# Patient Record
Sex: FEMALE | Race: WHITE | NOT HISPANIC OR LATINO | Employment: STUDENT | URBAN - METROPOLITAN AREA
[De-identification: names, ages, dates, MRNs, and addresses within clinical notes are randomized per-mention and may not be internally consistent; named-entity substitution may affect disease eponyms.]

---
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
## 2021-09-27 ENCOUNTER — COMMUNICATION - RIVER FALLS (OUTPATIENT)
Dept: FAMILY MEDICINE | Facility: CLINIC | Age: 21
End: 2021-09-27

## 2021-10-14 ENCOUNTER — COMMUNICATION - RIVER FALLS (OUTPATIENT)
Dept: FAMILY MEDICINE | Facility: CLINIC | Age: 21
End: 2021-10-14

## 2021-10-25 ENCOUNTER — OFFICE VISIT - RIVER FALLS (OUTPATIENT)
Dept: FAMILY MEDICINE | Facility: CLINIC | Age: 21
End: 2021-10-25

## 2022-01-26 ENCOUNTER — OFFICE VISIT - RIVER FALLS (OUTPATIENT)
Dept: FAMILY MEDICINE | Facility: CLINIC | Age: 22
End: 2022-01-26
Payer: COMMERCIAL

## 2022-02-11 VITALS
HEART RATE: 84 BPM | DIASTOLIC BLOOD PRESSURE: 62 MMHG | BODY MASS INDEX: 23.99 KG/M2 | SYSTOLIC BLOOD PRESSURE: 116 MMHG | HEIGHT: 65 IN | WEIGHT: 144 LBS | TEMPERATURE: 98.4 F

## 2022-02-11 VITALS
SYSTOLIC BLOOD PRESSURE: 104 MMHG | HEIGHT: 65 IN | DIASTOLIC BLOOD PRESSURE: 58 MMHG | TEMPERATURE: 98.4 F | WEIGHT: 132.8 LBS | BODY MASS INDEX: 22.13 KG/M2 | HEART RATE: 82 BPM

## 2022-02-11 VITALS
WEIGHT: 146 LBS | HEART RATE: 92 BPM | SYSTOLIC BLOOD PRESSURE: 118 MMHG | TEMPERATURE: 98.9 F | BODY MASS INDEX: 24.32 KG/M2 | HEIGHT: 65 IN | DIASTOLIC BLOOD PRESSURE: 72 MMHG

## 2022-02-11 VITALS
DIASTOLIC BLOOD PRESSURE: 60 MMHG | TEMPERATURE: 98.2 F | SYSTOLIC BLOOD PRESSURE: 118 MMHG | BODY MASS INDEX: 24.89 KG/M2 | HEIGHT: 65 IN | WEIGHT: 149.4 LBS | HEART RATE: 92 BPM

## 2022-02-15 NOTE — NURSING NOTE
Comprehensive Intake Entered On:  6/3/2020 1:26 PM CDT    Performed On:  6/3/2020 1:19 PM CDT by Hansa Harrell LPN               Summary   Chief Complaint :   cough x3-4 weeks, runny nose, HA, no fever - verbal consent for video visit via laptop/computer     Menstrual Status :   Menarcheal   Hansa Harrell LPN - 6/3/2020 1:19 PM CDT   Health Status   Allergies Verified? :   Yes   Medication History Verified? :   Yes   Medical History Verified? :   No   Pre-Visit Planning Status :   Not completed   Tobacco Use? :   Never smoker   Hansa Harrell LPN - 6/3/2020 1:19 PM CDT   Meds / Allergies   (As Of: 6/3/2020 1:26:09 PM CDT)   Allergies (Active)   No Known Medication Allergies  Estimated Onset Date:   Unspecified ; Created By:   Hansa Harrell LPN; Reaction Status:   Active ; Category:   Drug ; Substance:   No Known Medication Allergies ; Type:   Allergy ; Updated By:   Hansa Harrell LPN; Reviewed Date:   6/3/2020 1:25 PM CDT        Medication List   (As Of: 6/3/2020 1:26:09 PM CDT)   Prescription/Discharge Order    medroxyPROGESTERone  :   medroxyPROGESTERone ; Status:   Prescribed ; Ordered As Mnemonic:   Depo-Provera Contraceptive 150 mg/mL intramuscular suspension ; Simple Display Line:   150 mg, im, once, given now in L deltoid. lot:[id number] exp:12/2019. due for next shot 11/14/17-11/28/17, 1 mL, 0 Refill(s) ; Ordering Provider:   Rebekah Reza; Catalog Code:   medroxyPROGESTERone ; Order Dt/Tm:   8/29/2017 12:39:34 PM CDT            ID Risk Screen   Recent Travel History :   No recent travel   Family Member Travel History :   No recent travel   Other Exposure to Infectious Disease :   Unknown   Hansa Harrell LPN - 6/3/2020 1:19 PM CDT

## 2022-02-15 NOTE — PROGRESS NOTES
Patient:   SHEFALI WILSON            MRN: [id number]            FIN: [id number]               Age:   19 years     Sex:  Female     :  2000   Associated Diagnoses:   Cough; Suspected COVID-19 virus infection   Author:   Rebekah Reza      Visit Information      Date of Service: 2020 12:26 pm  Performing Location: [facility]  Encounter#: [id number]      Primary Care Provider (PCP):  Rebekah Reza    NPI# [id number]      Referring Provider:  Rebekah Reza# [id number]   Visit type:  video.    Participants in room during visit:  _Alvina Mao   Location of patient:  _home  Location of provider:  _clinic   Video Start Time:  _1:45  Video End Time:   _2:00    Today's visit was conducted via video conference due to the COVID-19 pandemic.  The patient's consent to proceed with a video visit has been obtained and documented.      Chief Complaint   6/3/2020 1:19 PM CDT     cough x3-4 weeks, runny nose, HA, no fever - verbal consent for video visit via laptop/computer        History of Present Illness   Patient is a _19 year old _female who is being evaluated via a billable video visit.  Has had cough 3-4 weeks, some nasal stuffiness and sore throat. 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  She was suppose to go to work today. She is not aware of others at the factory coughing.  Her family members are not ill.  no hx of asthma, no hx of use of an inhaler      Health Status   Allergies:    Allergic Reactions (Selected)  No Known Medication Allergies   Medications:  (Selected)   Prescriptions  Prescribed  Azithromycin 5 Day Dose Pack 250 mg oral tablet: 500mg day 1, 250 mg day 2-5, Oral, daily, # 6 tab(s), 0 Refill(s), Type: Maintenance, Pharmacy: [facility], 500mg day 1, 250 mg day 2-5 Oral daily, 65.25, in, 19 9:23:00 CDT, Height  Measured, 149.4, lb, 07/23/19 9:23:00 CDT, Weight Measured...  Depo-Provera Contraceptive 150 mg/mL intramuscular suspension: ( 150 mg ), im, once, Instructions: given now in L deltoid. lot:[id number] exp:12/2019. due for next shot 11/14/17-11/28/17, # 1 mL, 0 Refill(s), Type: Soft Stop, other reason (Rx)  predniSONE 5 mg oral tablet: = 3 tab(s) ( 15 mg ), Oral, bid, Instructions: take in a.m. and at 2pm, # 30 tab(s), 0 Refill(s), Type: Maintenance, Pharmacy: [facility], 3 tab(s) Oral bid,x5 day(s),Instr:take in a.m. and at 2pm, 65.25, in, 07/23/19 9:23:00 CDT, Height Measure...,    Medications          *denotes recorded medication          Azithromycin 5 Day Dose Pack 250 mg oral tablet: 500mg day 1, 250 mg day 2-5, Oral, daily, 6 tab(s), 0 Refill(s).          Depo-Provera Contraceptive 150 mg/mL intramuscular suspension: 150 mg, im, once, given now in L deltoid. lot:[id number] exp:12/2019. due for next shot 11/14/17-11/28/17, 1 mL, 0 Refill(s).          predniSONE 5 mg oral tablet: 15 mg, 3 tab(s), Oral, bid, for 5 day(s), take in a.m. and at 2pm, 30 tab(s), 0 Refill(s).       Problem list:    All Problems  Primary dysmenorrhea / SNOMED CT 194221332 / Confirmed      Histories   Past Medical History:    Resolved  Acute bronchiolitis due to respiratory syncytial virus (RSV) ([id number]):  Resolved.   Family History:    No family history items have been selected or recorded.   Procedure history:    No active procedure history items have been selected or recorded.   Social History:        Alcohol Assessment            Never, Household alcohol concerns: No.  Use of alcohol by peers: No.      Tobacco Assessment            Never, Household tobacco concerns: No.  Use of tobacco by peers: No.      Substance Abuse Assessment            Never, Household substance abuse concerns: No.  Use of drugs by peers: No.      Employment and Education Assessment            Student      Home and Environment Assessment            Lives with  Mother, Siblings.  Injuries/Abuse/Neglect in household: No.  Feels unsafe at home: No.               Family/Friends available for support: Yes.      Nutrition and Health Assessment            Type of diet: Regular.  Wants to lose weight: No.  Concerns about weight/body image: No.  Obtaining food is a               problem: No.      Exercise and Physical Activity Assessment            Exercise frequency: Daily.  Exercise type: Running, Walking.      Sexual Assessment            Sexually active: No.  Identifies as female, Sexual orientation: Straight or heterosexual.  History of STD:               No.  Contraceptive Use Details: Birth control shot.  History of sexual abuse: No.        Physical Examination   General:  Alert and oriented, No acute distress, harsh deep cough appreciated.    Eye:  Normal conjunctiva.    Respiratory:  Respirations are non-labored.    Psychiatric:  Cooperative, Appropriate mood & affect, Normal judgment.       Impression and Plan   Diagnosis     Cough (ZDO20-AZ R05).     Suspected COVID-19 virus infection (QAP99-HM R68.89).     Plan:  It is probable that she contracted a viral infection that has left her with reported tenderness over the sinuses and post nasal drip and cough. WIll treat this as sinusitis with antibiotic and prednisone; however,  given COVID19 in area, and fact that works at a factory, advised COVID19 testing for active disease and self quarantine till results are back. I wrote her a note for work  Patient is referred for [facility] COVID-19 testing and is instructed of the following:  Patient should remain isolated until results of test return and given that tests are not 100% accurate, would be safest to assume that they are contagious with COVID-19 until their symptoms have fully resolved. Isolation is recommended for at least 7 days from the onset of symptoms   Patient also is informed that testing will be done in their car at a scheduled time. Test will be sent to an  outside commercial lab and billed by that lab. [facility] cannot confirm to patient how billing will be handled by their insurance company.    Patient is also informed that testing for COVID-19 must be reported to the public health department along with contact information for the patient.   Patient information is given to scheduling staff to get patient scheduled for testing. Patient will receive further instructions from scheduling staff.  Patient is encouraged to call back at any time with questions or concerns.    I wrote a note for her employer, if she tests negative, she can return to work next week as she has been coughing for 3-4 weeks and is not likely infectious.  If she tests positive, she will need full 10 day isolation.    Orders     Orders (Selected)   Prescriptions  Prescribed  Azithromycin 5 Day Dose Pack 250 mg oral tablet: 500mg day 1, 250 mg day 2-5, Oral, daily, # 6 tab(s), 0 Refill(s), Type: Maintenance, Pharmacy: [facility], 500mg day 1, 250 mg day 2-5 Oral daily, 65.25, in, 07/23/19 9:23:00 CDT, Height Measured, 149.4, lb, 07/23/19 9:23:00 CDT, Weight Measured...  predniSONE 5 mg oral tablet: = 3 tab(s) ( 15 mg ), Oral, bid, Instructions: take in a.m. and at 2pm, # 30 tab(s), 0 Refill(s), Type: Maintenance, Pharmacy: [facility], 3 tab(s) Oral bid,x5 day(s),Instr:take in a.m. and at 2pm, 65.25, in, 07/23/19 9:23:00 CDT, Height Measure....

## 2022-02-15 NOTE — NURSING NOTE
Phone Message    PCP:    MARISSA      Time of Call: 11:27       Person Calling:  mother Alvina  Phone number:  [phone]    Time returned call:  11:37    Note:  Patient needs second TB screen.  Per protocol she can receive second 10 days after first.  Mother transferred to schedule nurse visit.

## 2022-02-15 NOTE — LETTER
(Inserted Image. Unable to display)     May 24, 2019      BRANDONARACELI CASANOVAON  [address] [place], [place] [id number]          Dear [place],      Thank you for selecting [facility] (previously [facility]) for your healthcare needs.      Our records indicate you are due for the following services:     Depo Provera injection due between 05/31 and 06/14/2019.      To schedule an appointment or if you have further questions, please contact your primary clinic:   [facility]       [phone]   [facility]       [phone]              [facility]     [phone]      Powered by [employer]press and [employer]    Sincerely,    MEEK JamisonNP

## 2022-02-15 NOTE — TELEPHONE ENCOUNTER
---------------------  From: Carmel KIM, Maritza DAVISON   To: Wiki-PR Message Pool ([id number]_[facility]);     Sent: 7/16/2020 11:40:59 AM CDT  Subject: Birth control     Phone Message    PCP:   Julia ANN    Time of Call:  _ 1127       Person Calling:  _ Mother of pt Alvina  Phone number:  _ [phone]      Note:   _ Mother of pt LM stating that pt had stopped getting the depo shots to see how her periods would be. Patient is getting her period every other week. Mother wondering if pt could try a birth control pill to help with this.     Please advise    Last office visit and reason:  _ 6-3-2020 videoAppt?---------------------  From: Hansa Harrell LPN (Wiki-PR Message Pool ([id number]_[facility]))   To: Rebekah Reza;     Sent: 7/16/2020 12:38:09 PM CDT  Subject: FW: Birth control---------------------  From: Rebekah Reza   To: Wiki-PR Message Pool ([id number]_[facility]);     Sent: 7/16/2020 12:44:36 PM CDT  Subject: RE: Birth control     it looks like she has been on pills in the past so I sent rx for the most recent pill she used and she can start it anytime (as long as she knows she isn't pregnant and then do a follow up video visit with me in 3-4 [id number] LM on mom's VM to have her have the patient call back.Pt called clinic and LM that she wants to follow up. I called pt back, states that she hasn't gotten period yet and would like to follow up with [facility]. I transferred her to scheduling to schedule video visit.

## 2022-02-15 NOTE — NURSING NOTE
PPD Reading POC Entered On:  6/20/2019 6:20 PM CDT    Performed On:  6/20/2019 6:16 PM CDT by Nikki Browne CMA               PPD Reading   PPD Interpretation :   Negative   PPD Completion Status :   Completed   Nikki Browne CMA - 6/20/2019 6:19 PM CDT

## 2022-02-15 NOTE — LETTER
(Inserted Image. Unable to display)     March 08, 2019      BRANDONARACELI CASANOVAON  [address] [place], [place] [id number]          Dear [place],      Thank you for selecting [facility] ([facility]viously [facility]) for your healthcare needs.      Our records indicate you are due for the following services:     Depo Provera injection due between 03/15 and 03/29/2019.      To schedule an appointment or if you have further questions, please contact your primary clinic:   [facility]       [phone]   [facility]       [phone]              [facility]     [phone]      Powered by [employer] and [employer]    Sincerely,    MEEK JamisonNP

## 2022-02-15 NOTE — LETTER
(Inserted Image. Unable to display)   January 21, 2020        SHEFALI CASANOVAON  [address] [place], [place] [id number]        Dear [place],      Thank you for selecting [facility] for your healthcare needs.    Our records indicate you are due for the following services:     Depo Provera injection due between 1/28/20 and 2/11/20.    To schedule an appointment or if you have further questions, please contact your primary clinic:   [facility]       [phone]   [facility]       [phone]              [facility]     [phone]      Powered by [employer]    Sincerely,    MEEK JamisonNP

## 2022-02-15 NOTE — PROGRESS NOTES
Patient:   SHEFALI WILSON            MRN: [id number]            FIN: [id number]               Age:   16 years     Sex:  Female     :  2000   Associated Diagnoses:   Well child check; Primary dysmenorrhea   Author:   Rebekah Reza      Visit Information      Date of Service: 2017 10:59 am  Performing Location: [facility]  Encounter#: [id number]      Primary Care Provider (PCP):  Rebekah Reza    NPI# [id number]   Visit type:  Well child exam.    Source of history:  Self.       Chief Complaint   2017 11:13 AM CDT   well child        Well Child History   Well Child History   Academics/ activities above average performance and   Denies Bullying    Wears Seat belts, will try for drivers license    Safe at home and at school    Denies anxiety /depression    Denies Smoker/drugs/ETOH/steroid use    Denies Sexually active, on depo for 6 months, has helped with heavy/irreg periods, only light spotting now  .     Socialization interacting well with family/ relatives and interacting well with peers/ friends.     Diet/ Feeding Diet includes dairy (skim or 1%), fruits and vegetables, protein, minimal soda and caffeine, minimal fast food and generally Eats breakfast.     Sleeping good sleeper.        Review of Systems   Constitutional:  Negative except as documented in history of present illness.    Eye:  Negative except as documented in history of present illness.    Ear/Nose/Mouth/Throat:  Negative except as documented in history of present illness.    Respiratory:  Negative except as documented in history of present illness.    Cardiovascular:  Negative except as documented in history of present illness.    Gastrointestinal:  Negative except as documented in history of present illness.    Genitourinary:  Negative except as documented in history of present illness.    Hematology/Lymphatics:  Negative except as documented in history of present illness.    Endocrine:  Negative except as  documented in history of present illness.    Immunologic:  Negative except as documented in history of present illness.    Musculoskeletal:  Negative except as documented in history of present illness.    Integumentary:  Negative except as documented in history of present illness.    Neurologic:  Negative except as documented in history of present illness.    Psychiatric:  Negative except as documented in history of present illness.    All other systems reviewed and negative      Health Status   Allergies:    Allergic Reactions (Selected)  Severity Not Documented  No known allergies (No reactions were documented)   Medications:  (Selected)   Prescriptions  Prescribed  Depo-Provera Contraceptive 150 mg/mL intramuscular suspension: ( 150 mg ), im, once, Instructions: given now in L deltoid. lot:[id number] exp:12/2019. due for next shot 11/14/17-11/28/17, # 1 mL, 0 Refill(s), Type: Soft Stop, other reason (Rx)   Problem list:    All Problems  Primary dysmenorrhea / SNOMED CT 478726130 / Confirmed  Resolved: Acute bronchiolitis due to respiratory syncytial virus (RSV) / SNOMED CT 926079547  Canceled: Influenza with Other Respiratory Manifestations / ICD-9-.1  Nasal swab.      Histories   Past Medical History:    Resolved  Acute bronchiolitis due to respiratory syncytial virus (RSV) ([id number]):  Resolved.   Family History:    No family history items have been selected or recorded.   Procedure history:    No active procedure history items have been selected or recorded.   Social History:        Alcohol Assessment            Never, Household alcohol concerns: No.  Use of alcohol by peers: No.      Tobacco Assessment            Never, Household tobacco concerns: No.  Use of tobacco by peers: No.      Substance Abuse Assessment            Never, Household substance abuse concerns: No.  Use of drugs by peers: No.      Home and Environment Assessment            Lives with Mother, Siblings.        Physical Examination    Vital Signs   8/29/2017 11:13 AM CDT Temperature Tympanic 98.9 DegF    Peripheral Pulse Rate 92 bpm  HI    Pulse Site Radial artery    HR Method Manual    Systolic Blood Pressure 118 mmHg    Diastolic Blood Pressure 72 mmHg    Mean Arterial Pressure 87 mmHg    BP Site Right arm    BP Method Manual      Measurements from flowsheet : Measurements   8/29/2017 11:13 AM CDT Height Measured - Standard 65.24 in    Weight Measured - Standard 146.0 lb    BSA 1.74 m2    Body Mass Index 24.11 kg/m2    Body Mass Index Percentile 82.66      General:  Alert and oriented.    Developmental screen - 13-17 year:  Elicited on exam.    Eye:  Pupils are equal, round and reactive to light, Normal conjunctiva.    HENT:  Normocephalic, Tympanic membranes are clear, Normal hearing, Oral mucosa is moist, No pharyngeal erythema.    Neck:  Supple, Non-tender, No lymphadenopathy, No thyromegaly.    Respiratory:  Lungs are clear to auscultation, Respirations are non-labored, Breath sounds are equal, Symmetrical chest wall expansion.    Cardiovascular:  Normal rate, Regular rhythm, No murmur, No gallop, Normal peripheral perfusion, No edema.    Gastrointestinal:  Soft, Non-tender, Non-distended, No organomegaly.    Musculoskeletal:  Normal range of motion, Normal strength, No tenderness, No swelling, No deformity, Normal gait.    Integumentary:  Warm, Dry, Pink, Intact, No rash.    Neurologic:  Alert, Oriented, Normal sensory, Normal motor function, Cranial Nerves II-XII are grossly intact.    Psychiatric:  Cooperative, Appropriate mood & affect, Normal judgment.       Impression and Plan   Diagnosis     Well child check (ENX67-QD Z00.129).     Primary dysmenorrhea (ULA81-QN N94.4).     Patient Instructions:       Counseled: Patient, Caregiver, Regarding diagnosis, Regarding treatment, Regarding medications, Diet, Activity, Verbalized understanding.    Anticipatory Guidance:       Adolescence (11 - 21 years): School performance, Television, Sex  education/ STD's, Seatbelts/ airbags, Depression/ anxiety, Alcohol/ drugs/ smoking, Suicide, Nutrition/ oral health ( Balanced meals, Brushing/ flossing, Avoiding tobacco ).    Counseled:  Patient.

## 2022-02-15 NOTE — PROGRESS NOTES
Patient:   SHEFALI WILSON            MRN: [id number]            FIN: [id number]               Age:   18 years     Sex:  Female     :  2000   Associated Diagnoses:   None   Author:   Rebekah Reza      Visit Information   Visit type:  Well child exam.    Source of history:  Self.       Chief Complaint   2019 9:23 AM CDT    Pt here for well child        Well Child History   Well Child History   Academics/ activities above average performance and   Denies Bullying    Wears Seat belts    Safe at home and at school    Denies anxiety /depression    Denies Smoker/drugs/ETOH/steroid use    Denies Sexually active, on depo for about 3 years for heavy/crampy periods, desires to continue as no menses  .     Socialization interacting well with family/ relatives and interacting well with peers/ friends.     Diet/ Feeding Diet includes dairy (skim or 1%), fruits and vegetables, protein, minimal soda and caffeine, minimal fast food and generally Eats breakfast.     Sleeping good sleeper.        Review of Systems   Constitutional:  Negative except as documented in history of present illness.    Eye:  Negative except as documented in history of present illness.    Ear/Nose/Mouth/Throat:  Negative except as documented in history of present illness.    Respiratory:  Negative except as documented in history of present illness.    Cardiovascular:  Negative except as documented in history of present illness.    Gastrointestinal:  Negative except as documented in history of present illness.    Genitourinary:  Negative except as documented in history of present illness.    Hematology/Lymphatics:  Negative except as documented in history of present illness.    Endocrine:  Negative except as documented in history of present illness.    Immunologic:  Negative except as documented in history of present illness.    Musculoskeletal:  Negative except as documented in history of present illness.    Integumentary:  Negative except  as documented in history of present illness.    Neurologic:  Negative except as documented in history of present illness.    Psychiatric:  Negative except as documented in history of present illness.    All other systems reviewed and negative      Health Status   Allergies:    Allergic Reactions (Selected)  Severity Not Documented  No known allergies (No reactions were documented)   Medications:  (Selected)   Prescriptions  Prescribed  Depo-Provera Contraceptive 150 mg/mL intramuscular suspension: ( 150 mg ), im, once, Instructions: given now in L deltoid. lot:[id number] exp:12/2019. due for next shot 11/14/17-11/28/17, # 1 mL, 0 Refill(s), Type: Soft Stop, other reason (Rx)   Problem list:    All Problems  Primary dysmenorrhea / SNOMED CT 295169048 / Confirmed  Resolved: Acute bronchiolitis due to respiratory syncytial virus (RSV) / SNOMED CT 003951533  Canceled: Influenza with Other Respiratory Manifestations / ICD-9-.1  Nasal swab.      Histories   Past Medical History:    Resolved  Acute bronchiolitis due to respiratory syncytial virus (RSV) (270875969):  Resolved.   Family History:    No family history items have been selected or recorded.   Procedure history:    No active procedure history items have been selected or recorded.   Social History:        Alcohol Assessment            Never, Household alcohol concerns: No.  Use of alcohol by peers: No.      Tobacco Assessment            Never, Household tobacco concerns: No.  Use of tobacco by peers: No.      Substance Abuse Assessment            Never, Household substance abuse concerns: No.  Use of drugs by peers: No.      Home and Environment Assessment            Lives with Mother, Siblings.      Physical Examination   Vital Signs   7/23/2019 9:23 AM CDT Temperature Tympanic 98.2 DegF    Peripheral Pulse Rate 92 bpm  HI    Systolic Blood Pressure 118 mmHg    Diastolic Blood Pressure 60 mmHg    Mean Arterial Pressure 79 mmHg      Measurements from flowsheet :  Measurements   7/23/2019 9:23 AM CDT Height Measured - Standard 65.25 in    Weight Measured - Standard 149.4 lb    BSA 1.76 m2    Body Mass Index 24.67 kg/m2    Body Mass Index Percentile 80.66      General:  Alert and oriented.    Developmental screen - 13-17 year:  Elicited on exam.    Eye:  Pupils are equal, round and reactive to light, Normal conjunctiva.    HENT:  Normocephalic, Tympanic membranes are clear, Normal hearing, Oral mucosa is moist, No pharyngeal erythema.    Neck:  Supple, Non-tender, No lymphadenopathy, No thyromegaly.    Respiratory:  Lungs are clear to auscultation, Respirations are non-labored, Breath sounds are equal, Symmetrical chest wall expansion.    Cardiovascular:  Normal rate, Regular rhythm, No murmur, No gallop, Normal peripheral perfusion, No edema.    Gastrointestinal:  Soft, Non-tender, Non-distended, No organomegaly.    Musculoskeletal:  Normal range of motion, Normal strength, No tenderness, No swelling, No deformity, Normal gait.    Integumentary:  Warm, Dry, Pink, Intact, No rash.    Neurologic:  Alert, Oriented, Normal sensory, Normal motor function, Cranial Nerves II-XII are grossly intact.    Psychiatric:  Cooperative, Appropriate mood & affect, Normal judgment.       Impression and Plan   Diagnosis   Patient Instructions:       Counseled: Patient, Caregiver, Regarding diagnosis, Regarding treatment, Regarding medications, Diet, Activity, Verbalized understanding.    Anticipatory Guidance:       Adolescence (11 - 21 years): School performance, Television, Seatbelts/ airbags, Depression/ anxiety, Alcohol/ drugs/ smoking, Nutrition/ oral health ( Balanced meals, Brushing/ flossing, Avoiding tobacco ).    Counseled:  Patient.

## 2022-02-15 NOTE — NURSING NOTE
Comprehensive Intake Entered On:  10/25/2021 2:56 PM CDT    Performed On:  10/25/2021 2:53 PM CDT by Hansa Harrell LPN               Summary   Chief Complaint :   needs refills on birth control pills, uses [facility] in [place] - verbal consent for video visit    Menstrual Status :   Menarcheal   Hansa Harrell LPN - 10/25/2021 2:53 PM CDT   Health Status   Allergies Verified? :   Yes   Medication History Verified? :   Yes   Medical History Verified? :   No   Pre-Visit Planning Status :   Not completed   Tobacco Use? :   Never smoker   Hansa Harrell LPN - 10/25/2021 2:53 PM CDT   Meds / Allergies   (As Of: 10/25/2021 2:56:23 PM CDT)   Allergies (Active)   No Known Medication Allergies  Estimated Onset Date:   Unspecified ; Created By:   Hansa Harrell LPN; Reaction Status:   Active ; Category:   Drug ; Substance:   No Known Medication Allergies ; Type:   Allergy ; Updated By:   Hansa Harrell LPN; Reviewed Date:   8/12/2020 4:16 PM CDT        Medication List   (As Of: 10/25/2021 2:56:23 PM CDT)   Prescription/Discharge Order    Miscellaneous Prescription  :   Miscellaneous Prescription ; Status:   Prescribed ; Ordered As Mnemonic:   valved holding chamber spacer ; Simple Display Line:   See Instructions, use with steroid inhaler, 1 EA, 0 Refill(s) ; Ordering Provider:   Rebekah Reza; Catalog Code:   Miscellaneous Prescription ; Order Dt/Tm:   6/8/2020 10:49:38 AM CDT          desogestrel-ethinyl estradiol  :   desogestrel-ethinyl estradiol ; Status:   Prescribed ; Ordered As Mnemonic:   Isibloom 0.15 mg-0.03 mg oral tablet ; Simple Display Line:   1 tab(s), Oral, daily, 28 tab(s), 0 Refill(s) ; Ordering Provider:   Rebekah Reza; Catalog Code:   desogestrel-ethinyl estradiol ; Order Dt/Tm:   10/15/2021 3:22:06 PM CDT          fluticasone  :   fluticasone ; Status:   Prescribed ; Ordered As Mnemonic:   fluticasone CFC free 220 mcg/inh inhalation aerosol ; Simple Display Line:   2  puff(s), inh, bid, for 30 day(s), use if cough persists  use with spacer chamber  rinse mouth and throat after use, 1 EA, 1 Refill(s) ; Ordering Provider:   Rebekah Reza; Catalog Code:   fluticasone ; Order Dt/Tm:   6/8/2020 10:45:37 AM CDT            Social History   Social History   (As Of: 10/25/2021 2:56:23 PM CDT)   Alcohol:        Never, Household alcohol concerns: No.  Use of alcohol by peers: No.   (Last Updated: 8/6/2015 6:11:53 PM CDT by Kathy Gu CMA)          Tobacco:        Never (less than 100 in lifetime)   (Last Updated: 10/25/2021 2:54:55 PM CDT by Hansa Harrell LPN)          Electronic Cigarette/Vaping:        Electronic Cigarette Use: Never.   (Last Updated: 10/25/2021 2:55:00 PM CDT by Hansa Harrell LPN)          Substance Abuse:        Never, Household substance abuse concerns: No.  Use of drugs by peers: No.   (Last Updated: 8/6/2015 6:12:14 PM CDT by Kathy Gu CMA)          Employment/School:        Student   (Last Updated: 7/24/2019 10:10:05 AM CDT by Keely Strange)          Home/Environment:        Lives with Mother, Siblings.  Injuries/Abuse/Neglect in household: No.  Feels unsafe at home: No.  Family/Friends available for support: Yes.   (Last Updated: 7/24/2019 10:10:38 AM CDT by Keely Strange)          Nutrition/Health:        Type of diet: Regular.  Wants to lose weight: No.  Concerns about weight/body image: No.  Obtaining food is a problem: No.   (Last Updated: 7/24/2019 10:10:49 AM CDT by Keely Strange)          Exercise:        Exercise frequency: Daily.  Exercise type: Running, Walking.   (Last Updated: 7/24/2019 10:11:04 AM CDT by Keely Strange)          Sexual:        Sexually active: No.  Identifies as female, Sexual orientation: Straight or heterosexual.  History of STD: No.  Contraceptive Use Details: Birth control shot.  History of sexual abuse: No.   (Last Updated: 7/24/2019 10:11:33 AM CDT by Keely Strange)

## 2022-02-15 NOTE — NURSING NOTE
Comprehensive Intake Entered On:  7/23/2019 9:25 AM CDT    Performed On:  7/23/2019 9:23 AM CDT by Fior Antunez               Summary   Chief Complaint :   Pt here for well child   Menstrual Status :   Menarcheal   Weight Measured :   149.4 lb(Converted to: 149 lb 6 oz, 67.77 kg)    Height Measured :   65.25 in(Converted to: 5 ft 5 in, 165.73 cm)    Body Mass Index :   24.67 kg/m2   Body Surface Area :   1.76 m2   Systolic Blood Pressure :   118 mmHg   Diastolic Blood Pressure :   60 mmHg   Mean Arterial Pressure :   79 mmHg   Peripheral Pulse Rate :   92 bpm (HI)    Temperature Tympanic :   98.2 DegF(Converted to: 36.8 DegC)    Fior Antunez - 7/23/2019 9:23 AM CDT   Health Status   Allergies Verified? :   Yes   Medication History Verified? :   Yes   Medical History Verified? :   Yes   Pre-Visit Planning Status :   Completed   Well Child Visit? :   Yes   Fior Antunez - 7/23/2019 9:23 AM CDT   Consents   Consent for Immunization Exchange :   Consent Granted   Consent for Immunizations to Providers :   Consent Granted   Fior Antunez - 7/23/2019 9:23 AM CDT   Meds / Allergies   (As Of: 7/23/2019 9:25:02 AM CDT)   Allergies (Active)   No known allergies  Estimated Onset Date:   Unspecified ; Created By:   Generated Domain User for [id number]; Reaction Status:   Active ; Substance:   No known allergies ; Updated By:   Generated Domain User for [id number]; Reviewed Date:   7/23/2019 9:24 AM CDT        Medication List   (As Of: 7/23/2019 9:25:03 AM CDT)   Prescription/Discharge Order    medroxyPROGESTERone  :   medroxyPROGESTERone ; Status:   Prescribed ; Ordered As Mnemonic:   Depo-Provera Contraceptive 150 mg/mL intramuscular suspension ; Simple Display Line:   150 mg, im, once, given now in L deltoid. lot:[id number] exp:12/2019. due for next shot 11/14/17-11/28/17, 1 mL, 0 Refill(s) ; Ordering Provider:   Rebekah Reza; Catalog Code:   medroxyPROGESTERone ; Order Dt/Tm:   8/29/2017 12:39:34 PM             Vision Testing POC   Corrective Lenses :   None   Eye, Left Visual Acuity :   20/20   Eye, Right Visual Acuity :   20/20   Eye, Bilateral Visual Acuity :   20/20   Fior Antunez - 7/23/2019 9:27 AM CDT

## 2022-02-15 NOTE — NURSING NOTE
Comprehensive Intake Entered On:  8/12/2020 4:18 PM CDT    Performed On:  8/12/2020 4:14 PM CDT by Tate Meade CMAie               Summary   Chief Complaint :   Follow up on birth control. Doing well. Verbal consent granted for video visit.   Last Menstrual Period :   7/10/2020 CDT   Menstrual Status :   Menarcheal   Mikayla Meade CMA - 8/12/2020 4:14 PM CDT   Health Status   Allergies Verified? :   Yes   Medication History Verified? :   Yes   Medical History Verified? :   Yes   Pre-Visit Planning Status :   Completed   Tobacco Use? :   Never smoker   Mikayla Meade CMA - 8/12/2020 4:14 PM CDT   Consents   Consent for Immunization Exchange :   Consent Granted   Consent for Immunizations to Providers :   Consent Granted   Deshaun ELMERLitoMikayla - 8/12/2020 4:14 PM CDT   Meds / Allergies   (As Of: 8/12/2020 4:18:22 PM CDT)   Allergies (Active)   No Known Medication Allergies  Estimated Onset Date:   Unspecified ; Created By:   Hansa Harrell LPN; Reaction Status:   Active ; Category:   Drug ; Substance:   No Known Medication Allergies ; Type:   Allergy ; Updated By:   Hansa Harrell LPN; Reviewed Date:   8/12/2020 4:16 PM CDT        Medication List   (As Of: 8/12/2020 4:18:22 PM CDT)   Prescription/Discharge Order    azithromycin  :   azithromycin ; Status:   Completed ; Ordered As Mnemonic:   Azithromycin 5 Day Dose Pack 250 mg oral tablet ; Simple Display Line:   500mg day 1, 250 mg day 2-5, Oral, daily, 6 tab(s), 0 Refill(s) ; Ordering Provider:   Rebekah Reza; Catalog Code:   azithromycin ; Order Dt/Tm:   6/3/2020 2:00:48 PM CDT          desogestrel-ethinyl estradiol  :   desogestrel-ethinyl estradiol ; Status:   Prescribed ; Ordered As Mnemonic:   Reclipsen 0.15 mg-0.03 mg oral tablet ; Simple Display Line:   1 tab(s), po, daily, 84 tab(s), 0 Refill(s) ; Ordering Provider:   Rebekah Reza; Catalog Code:   desogestrel-ethinyl estradiol ; Order Dt/Tm:   7/16/2020 12:43:21 PM CDT           fluticasone  :   fluticasone ; Status:   Prescribed ; Ordered As Mnemonic:   fluticasone CFC free 220 mcg/inh inhalation aerosol ; Simple Display Line:   2 puff(s), inh, bid, for 30 day(s), use if cough persists  use with spacer chamber  rinse mouth and throat after use, 1 EA, 1 Refill(s) ; Ordering Provider:   Rebekah Reza; Catalog Code:   fluticasone ; Order Dt/Tm:   6/8/2020 10:45:37 AM CDT          medroxyPROGESTERone  :   medroxyPROGESTERone ; Status:   Prescribed ; Ordered As Mnemonic:   Depo-Provera Contraceptive 150 mg/mL intramuscular suspension ; Simple Display Line:   150 mg, im, once, given now in L deltoid. lot:[id number] exp:12/2019. due for next shot 11/14/17-11/28/17, 1 mL, 0 Refill(s) ; Ordering Provider:   Rebekah Reza; Catalog Code:   medroxyPROGESTERone ; Order Dt/Tm:   8/29/2017 12:39:34 PM CDT          Miscellaneous Prescription  :   Miscellaneous Prescription ; Status:   Prescribed ; Ordered As Mnemonic:   valved holding chamber spacer ; Simple Display Line:   See Instructions, use with steroid inhaler, 1 EA, 0 Refill(s) ; Ordering Provider:   Rebekah Reza; Catalog Code:   Miscellaneous Prescription ; Order Dt/Tm:   6/8/2020 10:49:38 AM CDT          predniSONE  :   predniSONE ; Status:   Completed ; Ordered As Mnemonic:   predniSONE 5 mg oral tablet ; Simple Display Line:   15 mg, 3 tab(s), Oral, bid, for 5 day(s), take in a.m. and at 2pm, 30 tab(s), 0 Refill(s) ; Ordering Provider:   Rebekah Reza; Catalog Code:   predniSONE ; Order Dt/Tm:   6/3/2020 2:01:14 PM CDT            ID Risk Screen   Recent Travel History :   No recent travel   Family Member Travel History :   No recent travel   Other Exposure to Infectious Disease :   Unknown   Mikayla Meade CMA - 8/12/2020 4:14 PM CDT

## 2022-02-15 NOTE — NURSING NOTE
PPD Administration POC Entered On:  6/18/2019 6:26 PM CDT    Performed On:  6/18/2019 6:16 PM CDT by Nikki Browne CMA               PPD Administration   PPD Insertion Site :   Right forearm   PPD Amount Administered (mL) :   0.1 mL   Nikki Browne CMA - 6/18/2019 6:24 PM CDT   Details   Collection Date :   6/18/2019 6:16 PM CDT   Expiration Date :   5/24/2021 CDT   Lot#/Manufacture :   [id number]   Handling Specimen POC :   Tubersol    :   [employer]   POC Test Comments :   Step 2 of 2 step mantoux. Pt understands to return in 48-72 hours for reading.    Nikki Browne CMA - 6/18/2019 6:24 PM CDT

## 2022-02-15 NOTE — TELEPHONE ENCOUNTER
---------------------  From: Hansa Harrell LPN   To: Rebekah Reza;     Sent: 10/14/2021 12:23:52 PM CDT  Subject: BCP Refill       Received refill request from Levine, Susan. \[facility] Outlook.\"": Isibloom 1 po daily, last prescribed 9/27/21 #28+0  Pt has not been seen in clinic in over 1 year. Last annual exam 7/23/2019. Last visit was video visit 8/12/2020 for f/u birth control. I am guessing she is a student, how would you like to address. Does she need an actual in clinic visit or is a video/telephone visit??   ?   ---------------------  From: Rebekah Simon  To: Hansa Harrell LPN  Sent: October 15, 2021 9:26 AM MDT  Subject: RE: BCP Refill  ???  Please send for 1 month and video visit would be [id number] LM asking that she confirm the pharmacy. Only has [facility] listed. Also let her know that she does need to schedule a visit, and that a video visit would be fine.[id number] Spoke with patient. She uses [email or web address]. Pt tells me that she has an appt scheduled now for Monday. I will send rx x1 month.

## 2022-02-15 NOTE — LETTER
(Inserted Image. Unable to display)   June 08, 2020      MONTANA WILSON      [address] [place], [place] [id number]        Dear [place]TANA,    Thank you for selecting [facility] for your healthcare needs.  Below you will find the results of the recent tests done at our clinic.      Here are the test results from COVID19 that we discussed on the phone today. It is negative.  Given that your symptoms have been going on for well over 3 weeks, I think this is bronchospasm from a virus that won't calm the cough.  Glad you are feeling better now. There is a prescription for a steroid inhaler at the pharmacy for you if needed, [place]      Result Name Current Result Reference Range   Coronavirus SARS-CoV-2 (COVID-19)  NOT DETECTED 6/3/2020 NOT DETECTED -        Please contact me or my assistant at [phone] if you have any questions or concerns.     Sincerely,        ROSELYN Nichols-NP  Family Nurse Practitioner      What do your labs mean?  Below is a glossary of commonly ordered labs:  LDL   Bad Cholesterol   HDL   Good Cholesterol  AST/ALT   Liver Function   Cr/Creatinine   Kidney Function  Microalbumin   Kidney Function  BUN   Kidney Function  PSA   Prostate    TSH   Thyroid Hormone  HgbA1c   Diabetes Test   Hgb (Hemoglobin)   Red Blood Cells  WBC   White Blood Cell Count

## 2022-02-15 NOTE — NURSING NOTE
Depression Screening Entered On:  7/24/2019 10:12 AM CDT    Performed On:  7/23/2019 10:12 AM CDT by Keely Strange               Depression Screening   Little Interest - Pleasure in Activities :   Not at all   Feeling Down, Depressed, Hopeless :   Not at all   Initial Depression Screen Score :   0    Trouble Falling or Staying Asleep :   Not at all   Feeling Tired or Little Energy :   Not at all   Poor Appetite or Overeating :   Several days   Feeling Bad About Yourself :   Not at all   Trouble Concentrating :   Not at all   Moving or Speaking Slowly :   Not at all   Thoughts Better Off Dead or Hurting Self :   Not at all   Detailed Depression Screen Score :   1    Total Depression Screen Score :   1    LIZET Difficulty with Work, Home, Others :   Not difficult at all   Keely Strange - 7/24/2019 10:12 AM CDT

## 2022-02-15 NOTE — TELEPHONE ENCOUNTER
---------------------  From: Mikayla Mckeon   Sent: 6/3/2020 3:53:10 PM CDT  Subject: Curbside Testing     Patient was in for curbside testing. Per Rebekah Simon. O2 sat=97. Specimen sent to [facility] lab. Forms faxed to [facility].

## 2022-02-15 NOTE — PROGRESS NOTES
Patient:   SHEFALI WILSON            MRN: [id number]            FIN: [id number]               Age:   16 years     Sex:  Female     :  2000   Associated Diagnoses:   Primary dysmenorrhea   Author:   Rebekah Reza      Chief Complaint   3/24/2017 8:19 AM CDT    F/u birth control        Interval History   Concerning symptoms as listed in Chief Complaint above discussed and confirmed with patient, here with mom as well. Has used two different oc's , insurance won't cover Reclipsen and she had so much BTB the last 2 months that she just quit taking it. She did have some trouble remembering to take it, otherwise no side effects.  Using for dysmenorrhea and for irregular periods, using for abouy 8 months. Never sexually active. Needs a new method to control symptoms.         Health Status   Allergies:    Allergic Reactions (Selected)  Severity Not Documented  No known allergies (No reactions were documented)   Medications:  (Selected)   Prescriptions  Prescribed  Reclipsen 0.15 mg-0.03 mg oral tablet: 1 tab(s), po, daily, # 84 tab(s), 0 Refill(s), Type: Maintenance, Pharmacy: [facility], 1 tab(s) po daily   Problem list:    All Problems  Primary dysmenorrhea / SNOMED CT 131989075 / Confirmed  Acute bronchiolitis due to respiratory syncytial virus (RSV) / SNOMED CT 152769166 / Confirmed  Canceled: Influenza with Other Respiratory Manifestations / ICD-9-.1  Nasal swab.      Histories   Past Medical History:    Active  Acute bronchiolitis due to respiratory syncytial virus (RSV) ([id number])   Family History:    No family history items have been selected or recorded.   Procedure history:    No active procedure history items have been selected or recorded.   Social History:        Alcohol Assessment            Never, Household alcohol concerns: No.  Use of alcohol by peers: No.      Tobacco Assessment            Never, Household tobacco concerns: No.  Use of tobacco by peers: No.      Substance Abuse  Assessment            Never, Household substance abuse concerns: No.  Use of drugs by peers: No.      Home and Environment Assessment            Lives with Mother, Siblings.        Physical Examination   Vital Signs   3/24/2017 8:19 AM CDT Temperature Tympanic 98.4 DegF    Peripheral Pulse Rate 82 bpm    Pulse Site Radial artery    HR Method Manual    Systolic Blood Pressure 104 mmHg    Diastolic Blood Pressure 58 mmHg    Mean Arterial Pressure 73 mmHg    BP Site Right arm    BP Method Manual      Measurements from flowsheet : Measurements   3/24/2017 8:19 AM CDT Height Measured - Standard 65 in    Weight Measured - Standard 132.8 lb    BSA 1.66 m2    Body Mass Index 22.1 kg/m2    Body Mass Index Percentile 70.79      General:  Alert and oriented, No acute distress.       Review / Management   Results review:  UPT neg.       Impression and Plan   Diagnosis     Primary dysmenorrhea (GLP83-NL N94.4).     Patient Instructions:       Counseled: Patient, Regarding diagnosis, Regarding treatment, Regarding medications, Verbalized understanding, 15 minutes spent with this pt, all on counseling regarding the use/risks/benefits of various birthcontrol methods including ACHES symptoms and when to start her methods and the importance of back up protection as well as condom use to prevent STI's   Supported abstainence!  Chooses to start depo, she has superior calcium intake through diet, will remain physically active with wt bearing exercise to preserve bone strength and consider use for only 2-4 years at this time. Mom is in agreement.  RTC for next depo, use/risk/benefit discussed.

## 2022-02-15 NOTE — PROGRESS NOTES
Patient:   SHEFALI WILSON            MRN: [id number]            FIN: [id number]               Age:   17 years     Sex:  Female     :  2000   Associated Diagnoses:   Primary dysmenorrhea; Sports physical; Well child check   Author:   Rebekah Reza      Visit Information   Visit type:  Well child exam, Needs sports physical as well--may refer to [facility] completed history and physical for sports.    Source of history:  Self.       Chief Complaint   2018 10:17 AM CDT   sports physical        Well Child History   Well Child History   Socialization interacting well with family/ relatives and interacting well with peers/ friends.     Diet/ Feeding balanced.     Sleeping good sleeper.         Denies Bullying  Wearing seatbelt  Denies Drugs/Alcohol/Steroid Use  Denies suicidal thought  Denies sexual activity.     on depo for dysmenorrhea, due for depo today. She has good calcium intake      Review of Systems   Constitutional:  Negative except as documented in history of present illness.    Eye:  Negative except as documented in history of present illness, acceptable vision exam today.    Ear/Nose/Mouth/Throat:  Negative except as documented in history of present illness.    Respiratory:  No shortness of breath, No cough, No wheezing.    Cardiovascular:  No chest pain, No palpitations, No peripheral edema, No syncope.    Gastrointestinal:  No nausea, No vomiting.    Genitourinary:  Negative except as documented in history of present illness.    Hematology/Lymphatics:  Negative except as documented in history of present illness.    Endocrine:  Negative except as documented in history of present illness.    Immunologic:  no recent skin infections, No recurrent fevers, No recurrent infections.    Musculoskeletal:  Negative except as documented in history of present illness, no hx stinger or unresolved joint/muscular problems.    Integumentary:  No rash.    Neurologic:  Negative except as documented in history of  present illness, no concussion contraindications, No abnormal balance, No confusion, No numbness, No tingling.    Psychiatric:  Negative except as documented in history of present illness, No anxiety, No depression, Not suicidal.    All other systems reviewed and negative      Health Status   Allergies:    Allergic Reactions (Selected)  Severity Not Documented  No known allergies (No reactions were documented)   Medications:  (Selected)   Prescriptions  Prescribed  Depo-Provera Contraceptive 150 mg/mL intramuscular suspension: ( 150 mg ), im, once, Instructions: given now in L deltoid. lot:[id number] exp:12/2019. due for next shot 11/14/17-11/28/17, # 1 mL, 0 Refill(s), Type: Soft Stop, other reason (Rx)   Problem list:    All Problems  Primary dysmenorrhea / SNOMED CT 609613138 / Confirmed  Resolved: Acute bronchiolitis due to respiratory syncytial virus (RSV) / SNOMED CT 483307863  Canceled: Influenza with Other Respiratory Manifestations / ICD-9-.1  Nasal swab.      Histories   Past Medical History:    Resolved  Acute bronchiolitis due to respiratory syncytial virus (RSV) (259815631):  Resolved.   Family History:    No family history items have been selected or recorded.   Procedure history:    No active procedure history items have been selected or recorded.   Social History:        Alcohol Assessment            Never, Household alcohol concerns: No.  Use of alcohol by peers: No.      Tobacco Assessment            Never, Household tobacco concerns: No.  Use of tobacco by peers: No.      Substance Abuse Assessment            Never, Household substance abuse concerns: No.  Use of drugs by peers: No.      Home and Environment Assessment            Lives with Mother, Siblings.        Physical Examination   Vital Signs   7/18/2018 10:17 AM CDT Temperature Tympanic 98.4 DegF    Peripheral Pulse Rate 84 bpm    Pulse Site Radial artery    HR Method Manual    Systolic Blood Pressure 116 mmHg    Diastolic Blood Pressure  62 mmHg    Mean Arterial Pressure 80 mmHg    BP Site Right arm    BP Method Manual      Measurements from flowsheet : Measurements   7/18/2018 10:17 AM CDT Height Measured - Standard 65 in    Weight Measured - Standard 144 lb    BSA 1.73 m2    Body Mass Index 23.96 kg/m2    Body Mass Index Percentile 79.20      General:  Alert and oriented.    Developmental screen - 13-17 year:  Elicited on exam.    Eye:  Pupils are equal, round and reactive to light, Normal conjunctiva.    HENT:  Normocephalic, Tympanic membranes are clear, Normal hearing, Oral mucosa is moist, No pharyngeal erythema.    Neck:  Supple, Non-tender, No lymphadenopathy, No thyromegaly.    Respiratory:  Lungs are clear to auscultation, Respirations are non-labored, Breath sounds are equal, Symmetrical chest wall expansion.    Cardiovascular:  Normal rate, Regular rhythm, No murmur, No gallop, Normal peripheral perfusion, No edema.    Gastrointestinal:  Soft, Non-tender, Non-distended, No organomegaly.    Genitourinary:  Normal genitalia for age and sex.    Musculoskeletal:  Normal range of motion, Normal strength, No tenderness, No swelling, No deformity, Normal gait, normal duck walk and straight line walk. No signs of scoliosis.    Integumentary:  Warm, Dry, Pink, Intact, No rash.    Neurologic:  Alert, Oriented, Normal sensory, Normal motor function, Cranial Nerves II-XII are grossly intact.    Psychiatric:  Cooperative, Appropriate mood & affect, Normal judgment.       Impression and Plan   Diagnosis     Primary dysmenorrhea (STH91-NG N94.4).     Sports physical (FEO55-ZI Z02.5).     Well child check (LYJ92-RC Z00.129).     Plan:  Immunizations per schedule.         Diet: Age appropriate diet.    Patient Instructions:       Counseled: Patient, Family, Regarding diagnosis, Regarding treatment, Regarding medications, Diet, Activity, Verbalized understanding.    Orders     No Contraindications to sports participation.     Anticipatory Guidance:        Adolescence (11 - 21 years): Peer relations, School performance, Television, Substance abuse, Self image/ dieting, Sexual identity/ dating, Sex education/ STD's, Sports injuries, Seatbelts/ airbags, Depression/ anxiety, Alcohol/ drugs/ smoking, Suicide, Nutrition/ oral health ( Balanced meals, Obesity, Brushing/ flossing, Avoiding tobacco ).    Counseled:  Patient.

## 2022-02-15 NOTE — TELEPHONE ENCOUNTER
Entered by Reuben Nieto CMA on September 27, 2021 12:29:43 PM CDT  ---------------------  From: Reuben Nieto CMA   To: [facility]    Sent: 9/27/2021 12:29:43 PM CDT  Subject: Medication Management     ** Submitted: **  Order:desogestrel-ethinyl estradiol (Isibloom 0.15 mg-0.03 mg oral tablet)  1 tab(s)  Oral  daily  Qty:  28 tab(s)        Refills:  0          Substitutions Allowed     Route To Pharmacy [facility]    Signed by Reuben Nieto CMA  9/27/2021 5:28:00 PM [facility]    ** Submitted: **  Complete:desogestrel-ethinyl estradiol (Reclipsen 0.15 mg-0.03 mg oral tablet)   Signed by Reuben Nieto CMA  9/27/2021 5:29:00 PM [facility]    ** Not Approved:  **  desogestrel-ethinyl estradiol (Isibloom 0.15 mg-0.03 mg tablet)  TAKE ONE TABLET BY MOUTH EVERY DAY  Qty:  84 tab(s)        Days Supply:  28        Refills:  11          Substitutions Allowed     Route To Pharmacy - [facility]   Note from Pharmacy:  This prescription was filled on 8/9/2021. Any refills authorized will be placed on file.  Signed by Reuben Nieto CMA            ** Patient matched by Reuben Nieto CMA on 9/27/2021 12:27:49 PM CDT **      ------------------------------------------  From: [facility]  To: Rebekah Reza  Sent: September 26, 2021 11:38:41 AM CDT  Subject: Medication Management  Due: September 17, 2021 11:19:24 PM CDT     ** On Hold Pending Signature **     Drug: desogestrel-ethinyl estradiol (Reclipsen 0.15 mg-0.03 mg oral tablet), TAKE ONE TABLET BY MOUTH EVERY DAY  Quantity: 84 tab(s)  Days Supply: 28  Refills: 2  Substitutions Allowed  Notes from Pharmacy:     Dispensed Drug: desogestrel-ethinyl estradiol (Isibloom 0.15 mg-0.03 mg oral tablet), TAKE ONE TABLET BY MOUTH EVERY DAY  Quantity: 84 tab(s)  Days Supply: 28  Refills: 11  Substitutions Allowed  Notes from Pharmacy: This prescription was filled on 8/9/2021. Any refills authorized will be placed on file.  ------------------------------------------  **  Submitted: **  Order:Return to Clinic (Request)  Details:  RFV: Yearly exam/physical, Return in 1 month, RTC Date 07/23/20         Signed by Reuben Nieto CMA  9/27/2021 5:30:00 PM [facility]        Med Refill      Date of last office visit and reason:  8/12/20 Birth control pill maintenance      Date of last Med Check / Px:     Date of last labs pertaining to med:      Note:  Rx filled per protocol.  Reuben Nieto CMA    RTC order in chart:  yes    For Protocol refill, has patient been contacted:  _

## 2022-02-15 NOTE — PROGRESS NOTES
Patient:   SHEFALI WILSON            MRN: [id number]            FIN: [id number]               Age:   21 years     Sex:  Female     :  2000   Associated Diagnoses:   Primary dysmenorrhea   Author:   Rebekah Reza      Visit Information      Date of Service: 10/25/2021 06:38 am  Performing Location: [facility]  Encounter#: [id number]      Primary Care Provider (PCP):  Rebekah Reza    NPI# [id number]      Referring Provider:  Rebekah Reza# [id number]   Visit type:  video.    Participants in room during visit:  _pt   Location of patient:  _school  Location of provider:  _ clinic  Video Start Time:  315  Video End Time:   _325    Today's visit was conducted via video conference due to the COVID-19 pandemic.  The patient's consent to proceed with a video visit has been obtained and documented.      Chief Complaint   10/25/2021 2:53 PM CDT   needs refills on birth control pills, uses [facility] in [place] - verbal consent for video visit        History of Present Illness   Patient is a _ 21year old F_ who is being evaluated via a billable video visit.  using oc's , no missed doses, no ACHES, LMP 3 weeks ago, desires to continue  needs refill      Health Status   Allergies:    Allergic Reactions (Selected)  No Known Medication Allergies   Medications:  (Selected)   Prescriptions  Prescribed  Isibloom 0.15 mg-0.03 mg oral tablet: 1 tab(s), Oral, daily, # 84 tab(s), 4 Refill(s), Type: Maintenance, [facility]acy[id number], 1 tab(s) Oral daily  fluticasone CFC free 220 mcg/inh inhalation aerosol: = 2 puff(s), inh, bid, Instructions: use if cough persists  use with spacer chamber  rinse mouth and throat after use, # 1 EA, 1 Refill(s), Type: Maintenance, Pharmacy: [facility], 2 puff(s) Inhale bid,x30 day(s),Instr:use if cough persists; use...  valved holding chamber spacer: valved holding chamber spacer, See Instructions, Instructions: use with steroid  inhaler, Supply, # 1 EA, 0 Refill(s), Type: Maintenance, Pharmacy: [facility], use with steroid inhaler, 65.25, in, 07/23/19 9:23:00 CDT, Height Measured, 149.4, lb,...,    Medications          *denotes recorded medication          valved holding chamber spacer: See Instructions, use with steroid inhaler, 1 EA, 0 Refill(s).          Isibloom 0.15 mg-0.03 mg oral tablet: 1 tab(s), Oral, daily, 84 tab(s), 4 Refill(s).          fluticasone CFC free 220 mcg/inh inhalation aerosol: 2 puff(s), inh, bid, for 30 day(s), use if cough persists  use with spacer chamber  rinse mouth and throat after use, 1 EA, 1 Refill(s).       Problem list:    All Problems  Primary dysmenorrhea / SNOMED CT 974692767 / Confirmed      Histories   Past Medical History:    Resolved  Acute bronchiolitis due to respiratory syncytial virus (RSV) (647440670):  Resolved.   Family History:    No family history items have been selected or recorded.   Procedure history:    No active procedure history items have been selected or recorded.   Social History:        Electronic Cigarette/Vaping Assessment            Electronic Cigarette Use: Never.      Alcohol Assessment            Never, Household alcohol concerns: No.  Use of alcohol by peers: No.      Tobacco Assessment            Never (less than 100 in lifetime)      Substance Abuse Assessment            Never, Household substance abuse concerns: No.  Use of drugs by peers: No.      Employment and Education Assessment            Student      Home and Environment Assessment            Lives with Mother, Siblings.  Injuries/Abuse/Neglect in household: No.  Feels unsafe at home: No.               Family/Friends available for support: Yes.      Nutrition and Health Assessment            Type of diet: Regular.  Wants to lose weight: No.  Concerns about weight/body image: No.  Obtaining food is a               problem: No.      Exercise and Physical Activity Assessment            Exercise frequency:  Daily.  Exercise type: Running, Walking.      Sexual Assessment            Sexually active: No.  Identifies as female, Sexual orientation: Straight or heterosexual.  History of STD:               No.  Contraceptive Use Details: Birth control shot.  History of sexual abuse: No.        Physical Examination   General:  Alert and oriented, No acute distress.    Eye:  Normal conjunctiva.    Respiratory:  Respirations are non-labored.    Psychiatric:  Cooperative, Appropriate mood & affect, Normal judgment.       Impression and Plan   Diagnosis     Primary dysmenorrhea (HIP28-VC N94.4).     Course:  doing well.    Patient Instructions:       Counseled: Patient.    Orders     Orders (Selected)   Prescriptions  Prescribed  Isibloom 0.15 mg-0.03 mg oral tablet: 1 tab(s), Oral, daily, # 84 tab(s), 4 Refill(s), Type: Maintenance, Pharmacy: [facility] #[id number], 1 tab(s) Oral daily.

## 2022-02-15 NOTE — TELEPHONE ENCOUNTER
---------------------  From: Candice Wright RN   Sent: 11/17/2021 3:51:19 PM CST  Subject: Transfer OC to mail order pharmacy     Time of Call:  1515  Return call at:1545     Person Calling:  patient  Phone number:  [phone]    Note:   She would like her Isibloom OC transferred to [facility].  This is done.  Last fill was last filled 10/25/21 # 84 with 4 refills per [facility].

## 2022-02-15 NOTE — PROGRESS NOTES
Patient:   SHEFALI WILSON            MRN: [id number]            FIN: [id number]               Age:   19 years     Sex:  Female     :  2000   Associated Diagnoses:   Visit for birth control pills maintenance   Author:   Rebekah Reza      Visit Information      Date of Service: 2020 07:38 am  Performing Location: [facility]  Encounter#: [id number]      Primary Care Provider (PCP):  Rebekah Reza    NPI# [id number]      Referring Provider:  Rebekah Reza# [id number]   Visit type:  video.    Participants in room during visit:  _pt   Location of patient:  _home  Location of provider:  _ clinic  Video Start Time:  4:20  Video End Time:   _4:35    Today's visit was conducted via video conference due to the COVID-19 pandemic.  The patient's consent to proceed with a video visit has been obtained and documented.      Chief Complaint   2020 4:14 PM CDT    Follow up on birth control. Doing well. Verbal consent granted for video visit.        History of Present Illness   Patient is a _19 year old  female_ who is being evaluated via a billable video visit.  Stopped Depo in March and decided to use oc's, started them  (had menses 7/10 post depo use of 3 years). Has never had IC  Remembers to take oc, takes at 9pm   having some nausea in the mornings, thinks it started before she started using oc's  no ACHES  will leave for [facility] to study athletic training next week      Health Status   Allergies:    Allergic Reactions (Selected)  No Known Medication Allergies   Medications:  (Selected)   Prescriptions  Prescribed  Depo-Provera Contraceptive 150 mg/mL intramuscular suspension: ( 150 mg ), im, once, Instructions: given now in L deltoid. lot:[id number] exp:2019. due for next shot 17-17, # 1 mL, 0 Refill(s), Type: Soft Stop, other reason (Rx)  Reclipsen 0.15 mg-0.03 mg oral tablet: 1 tab(s), po, daily, # 84 tab(s), 3 Refill(s), Type: Maintenance,  Pharmacy: [facility], 1 tab(s) Oral daily, 65.25, in, 07/23/19 9:23:00 CDT, Height Measured, Weight Measured  fluticasone CFC free 220 mcg/inh inhalation aerosol: = 2 puff(s), inh, bid, Instructions: use if cough persists  use with spacer chamber  rinse mouth and throat after use, # 1 EA, 1 Refill(s), Type: Maintenance, Pharmacy: [facility], 2 puff(s) Inhale bid,x30 day(s),Instr:use if cough persists; use...  valved holding chamber spacer: valved holding chamber spacer, See Instructions, Instructions: use with steroid inhaler, Supply, # 1 EA, 0 Refill(s), Type: Maintenance, Pharmacy: [facility], use with steroid inhaler, 65.25, in, 07/23/19 9:23:00 CDT, Height Measured, 149.4, lb,...,    Medications          *denotes recorded medication          valved holding chamber spacer: See Instructions, use with steroid inhaler, 1 EA, 0 Refill(s).          Reclipsen 0.15 mg-0.03 mg oral tablet: 1 tab(s), po, daily, 84 tab(s), 3 Refill(s).          fluticasone CFC free 220 mcg/inh inhalation aerosol: 2 puff(s), inh, bid, for 30 day(s), use if cough persists  use with spacer chamber  rinse mouth and throat after use, 1 EA, 1 Refill(s).          Depo-Provera Contraceptive 150 mg/mL intramuscular suspension: 150 mg, im, once, given now in L deltoid. lot:[id number] exp:12/2019. due for next shot 11/14/17-11/28/17, 1 mL, 0 Refill(s).       Problem list:    All Problems  Primary dysmenorrhea / SNOMED CT 240333755 / Confirmed      Histories   Past Medical History:    Resolved  Acute bronchiolitis due to respiratory syncytial virus (RSV) (919709835):  Resolved.   Family History:    No family history items have been selected or recorded.   Procedure history:    No active procedure history items have been selected or recorded.   Social History:        Alcohol Assessment            Never, Household alcohol concerns: No.  Use of alcohol by peers: No.      Tobacco Assessment            Never, Household tobacco concerns: No.   Use of tobacco by peers: No.      Substance Abuse Assessment            Never, Household substance abuse concerns: No.  Use of drugs by peers: No.      Employment and Education Assessment            Student      Home and Environment Assessment            Lives with Mother, Siblings.  Injuries/Abuse/Neglect in household: No.  Feels unsafe at home: No.               Family/Friends available for support: Yes.      Nutrition and Health Assessment            Type of diet: Regular.  Wants to lose weight: No.  Concerns about weight/body image: No.  Obtaining food is a               problem: No.      Exercise and Physical Activity Assessment            Exercise frequency: Daily.  Exercise type: Running, Walking.      Sexual Assessment            Sexually active: No.  Identifies as female, Sexual orientation: Straight or heterosexual.  History of STD:               No.  Contraceptive Use Details: Birth control shot.  History of sexual abuse: No.        Physical Examination   General:  Alert and oriented, No acute distress.    Eye:  Normal conjunctiva.    Respiratory:  Respirations are non-labored.    Psychiatric:  Cooperative, Appropriate mood & affect, Normal judgment.       Impression and Plan   Diagnosis     Visit for birth control pills maintenance (OIY73-IV Z30.41).     Plan:  take oc at noon with a meal to avoid nausea  refilled 1 year, call or return if any problems.    Orders     Orders (Selected)   Prescriptions  Prescribed  Reclipsen 0.15 mg-0.03 mg oral tablet: 1 tab(s), po, daily, # 84 tab(s), 3 Refill(s), Type: Maintenance, Pharmacy: [facility], 1 tab(s) Oral daily, 65.25, in, 07/23/19 9:23:00 CDT, Height Measured, Weight Measured.

## 2022-02-15 NOTE — TELEPHONE ENCOUNTER
---------------------  From: Jo Ann Leon CMA   Sent: 3/30/2021 3:28:04 PM CDT  Subject: immunization record     Pt calls requesting a copy of her immunization records be mailed to her. I have printed from [facility] and done this.

## 2022-02-15 NOTE — NURSING NOTE
PPD Administration POC Entered On:  6/4/2019 12:47 PM CDT    Performed On:  6/4/2019 12:44 PM CDT by Candice Wright RN               PPD Administration   PPD Insertion Site :   Left forearm   PPD Amount Administered (mL) :   0.1 mL   Candice Wright RN - 6/4/2019 12:44 PM CDT   Details   Collection Date :   6/4/2019 12:40 PM CDT   Expiration Date :   5/24/2021 CDT   Lot#/Manufacture :   [id number]    :   [employer] Watson RN, Elizabeth - 6/4/2019 12:44 PM CDT

## 2022-02-15 NOTE — LETTER
(Inserted Image. Unable to display)   October 28, 2021  SHEFALI WILSON  [address] [place] [id number]        Dear [place],    Thank you for selecting [facility] for your healthcare needs.    Our records indicate you are due for the following services:     Annual Physical and Gynecologic Exam ~ Yearly wellness exams are important for your ongoing health and wellness.  This exam gives you the opportunity to meet with your Healthcare Provider to review your health, update immunizations and to recommend preventive screenings that you may be due for.  At your wellness visit, your health care provider will determine the need for a gynecologic exam and/or pap smear.     (FYI   Regarding office visits: In some instances, a video visit or telephone visit may be offered as an option.)    To schedule an appointment or if you have further questions, please contact your clinic at [phone].    Powered by [employer]    Sincerely,     KAREN Nichols

## 2022-02-15 NOTE — TELEPHONE ENCOUNTER
---------------------  From: Teodora Mitchell CMA (Phone Messages Pool ([address]))   To: [facility] Message Pool ([address] [place] [address]);     Sent: 2/11/2020 12:55:59 PM CST  Subject: General Message-spotting with Depo     Phone Message    PCP:   MARISSA      Time of Call:  1140       Person Calling:  self  Phone number:  [phone]    Returned call at: 1250    Note:   pt states she rec'd her Depo shot 1/29 and started having light bleeding 2/9 and again today.  She's wondering if this is normal and if she should be doing anything different.  States she hasn't had any issues for over a year with any bleeding.  She does note that she came down with influenza Sunday also.  Please advise    Last office visit and reason:  7/23/2019---------------------  From: Hansa Harrell LPN ([facility] Message Pool ([address]))   To: Rebekah Reza;     Sent: 2/11/2020 1:05:55 PM CST  Subject: FW: General Message-spotting with Depo---------------------  From: Rebekah Reza   To: [facility] Message Pool ([address]);     Sent: 2/11/2020 1:49:15 PM CST  Subject: RE: General Message-spotting with Depo     unpredicatable spotting/bleeding is normal with Depo, even if you haven't had it for a long while. If it is heavy and prolonged, come and see me2:10pm Tried to contact patient, no answer, no option to leave a message.9:36am Attempted to reach patient again; still no answer and unable to LM.

## 2022-02-15 NOTE — NURSING NOTE
PPD Reading POC Entered On:  6/6/2019 12:40 PM CDT    Performed On:  6/6/2019 12:40 PM CDT by Candice Wright RN               PPD Reading   PPD mm of Induration :   0 mm   PPD Interpretation :   Negative   Candice Wright RN - 6/6/2019 12:40 PM CDT

## 2022-03-02 NOTE — PROGRESS NOTES
Patient:   SHEFALI WILSON            MRN: [id number]            FIN: [id number]               Age:   21 years     Sex:  Female     :  2000   Associated Diagnoses:   Situational anxiety   Author:   Rebekah Reza      Visit Information      Date of Service: 2022 06:31 am  Performing Location: [facility]  Encounter#: [id number]      Primary Care Provider (PCP):  Rebekah Reza    NPI# [id number]      Referring Provider:  Rebekah Reza# [id number]   Visit type:  video.    Participants in room during visit:  _pt   Location of patient:  _school  Location of provider:  _ clinic  Video Start Time: 300   Video End Time:   _315    Today's visit was conducted via video conference due to the COVID-19 pandemic.  The patient's consent to proceed with a video visit has been obtained and documented.      Chief Complaint   2022 2:27 PM CST    having a lot of issues with anxiety and emotions, not on any medications for this, never has been, PHQ9=10, ALBERTO=14 - verbal consent for video visit        History of Present Illness   Patient is a _21 year old _ F who is being evaluated via a billable video visit.  she is in college  contracted COVID 19 about 3 weeks ago and in her second week of isolation she started to have high anxiety about introspective thoughts regarding changing her major, sexual relationships with same sex partner.  She has never doubted these things but now having high anxiety about these thoughts. No thoughts of self harm, denies depression, alberto score 14    she is now seeing counselor twice with more scheduled  she uses friends and parents for support people  sleeping ok, academics ok but having hard time focusing, she is in sophomore majoring in movement science.  she is doing some exercise, dad recommended this  she is not eating very well lately, is having some nausea but making herself eat  has never used meds for anxiety or depresison and not sure it she  wants to use meds      Health Status   Allergies:    Allergic Reactions (Selected)  No Known Medication Allergies   Medications:  (Selected)   Prescriptions  Prescribed  Isibloom 0.15 mg-0.03 mg oral tablet: 1 tab(s), Oral, daily, # 84 tab(s), 4 Refill(s), Type: Maintenance, Pharmacy: [facility], 1 tab(s) Oral daily  fluticasone CFC free 220 mcg/inh inhalation aerosol: = 2 puff(s), inh, bid, Instructions: use if cough persists  use with spacer chamber  rinse mouth and throat after use, # 1 EA, 1 Refill(s), Type: Maintenance, Pharmacy: [facility], 2 puff(s) Inhale bid,x30 day(s),Instr:use if cough persists; use...  valved holding chamber spacer: valved holding chamber spacer, See Instructions, Instructions: use with steroid inhaler, Supply, # 1 EA, 0 Refill(s), Type: Maintenance, Pharmacy: [facility], use with steroid inhaler, 65.25, in, 07/23/19 9:23:00 CDT, Height Measured, 149.4, lb,...,    Medications          *denotes recorded medication          valved holding chamber spacer: See Instructions, use with steroid inhaler, 1 EA, 0 Refill(s).          Isibloom 0.15 mg-0.03 mg oral tablet: 1 tab(s), Oral, daily, 84 tab(s), 4 Refill(s).          fluticasone CFC free 220 mcg/inh inhalation aerosol: 2 puff(s), inh, bid, for 30 day(s), use if cough persists  use with spacer chamber  rinse mouth and throat after use, 1 EA, 1 Refill(s).       Problem list:    All Problems  Primary dysmenorrhea / SNOMED CT [id number] / Confirmed      Histories   Past Medical History:    Resolved  Acute bronchiolitis due to respiratory syncytial virus (RSV) ([id number]):  Resolved.   Family History:    No family history items have been selected or recorded.   Procedure history:    No active procedure history items have been selected or recorded.   Social History:        Electronic Cigarette/Vaping Assessment            Electronic Cigarette Use: Never.      Alcohol Assessment            Never, Household  alcohol concerns: No.  Use of alcohol by peers: No.      Tobacco Assessment            Never (less than 100 in lifetime)      Substance Abuse Assessment            Never, Household substance abuse concerns: No.  Use of drugs by peers: No.      Employment and Education Assessment            Student      Home and Environment Assessment            Lives with Mother, Siblings.  Injuries/Abuse/Neglect in household: No.  Feels unsafe at home: No.               Family/Friends available for support: Yes.      Nutrition and Health Assessment            Type of diet: Regular.  Wants to lose weight: No.  Concerns about weight/body image: No.  Obtaining food is a               problem: No.      Exercise and Physical Activity Assessment            Exercise frequency: Daily.  Exercise type: Running, Walking.      Sexual Assessment            Sexually active: No.  Identifies as female, Sexual orientation: Straight or heterosexual.  History of STD:               No.  Contraceptive Use Details: Birth control shot.  History of sexual abuse: No.        Physical Examination   General:  Alert and oriented, No acute distress.    Eye:  Normal conjunctiva.    Respiratory:  Respirations are non-labored.    Psychiatric:  Cooperative, Appropriate mood & affect, Normal judgment.       Impression and Plan   Diagnosis     Situational anxiety (JXQ59-OF F41.8).     Plan:  This episode of anxiety seems very much specific to recent isolation and it is reasonalbe to give non-medicinal remedies a trial at seeing resolution.  use healthy coping [facility] as discussed  continue counseling  f/u with me in 2 weeks  if things are worsening, f/u sooner and consider medication start  she is agreeable to plan.    Patient Instructions:       Counseled: Patient.

## 2022-03-02 NOTE — NURSING NOTE
Generalized Anxiety Disorder Screening Entered On:  1/26/2022 2:51 PM CST    Performed On:  1/26/2022 2:50 PM CST by Hansa Harrell LPN               LIZET-7   LIZET Nervous, Anxious On Edge :   Nearly every day   LIZET Control Worrying B :   Nearly every day   LIZET Worrying Too Much :   Nearly every day   LIZET Trouble Relaxing :   More than half the days   LIZET Restless :   More than half the days   LIZET Easily Annoyed/Irritable :   Not at all   LIZET Afraid :   Several days   LIZET Total Screening Score :   14    LIZET Difficulty with Work, Home, Others :   Very difficult   Hansa Harrell LPN - 1/26/2022 2:50 PM CST

## 2022-03-02 NOTE — NURSING NOTE
Depression Screening Entered On:  1/26/2022 2:50 PM CST    Performed On:  1/26/2022 2:50 PM CST by Hansa Harrell LPN               Depression Screening   Little Interest - Pleasure in Activities :   Not at all   Feeling Down, Depressed, Hopeless :   Several days   Initial Depression Screen Score :   1 Score   Poor Appetite or Overeating :   Several days   Trouble Falling or Staying Asleep :   Not at all   Feeling Tired or Little Energy :   Nearly every day   Feeling Bad About Yourself :   Several days   Trouble Concentrating :   Several days   Moving or Speaking Slowly :   Nearly every day   Thoughts Better Off Dead or Hurting Self :   Not at all   Difficulty at Work, Home, Getting Along :   Very difficult   Detailed Depression Screen Score :   9    Total Depression Screen Score :   10    Hansa Harrell LPN - 1/26/2022 2:50 PM CST

## 2022-03-02 NOTE — TELEPHONE ENCOUNTER
---------------------  From: Bobby KIM, Nadja THAKKAR (Phone Messages Pool ([id number]_[facility]))   To: [facility] Message Pool ([id number]_[facility]);     Sent: 1/25/2022 8:13:12 AM CST  Subject: Crying, down     Phone message    PCP:   MARISSA      Time of Call:  0808       Person Calling:  Alvina Nicole  Phone number:  _    Returned call at: _    Note:   Mom is concerned about pt. States she is just breaking down crying. Has been very down for the last week. Pt is back at school.   Mom is wondering what to do to help pt. Pt is seeing a counselor every 2 weeks or so.    I advised pt make a phone appt to discuss sx and possible med starting. Mom agreed. Transferred to scheduling to make appt.    Last office visit and reason:  _---------------------  From: Hansa Harrell LPN ([facility] Message Pool ([id number]_[facility]))   To: Rebekah Rzea;     Sent: 1/25/2022 8:21:59 AM CST  Subject: FW: Crying, downnoted, agree

## 2022-03-02 NOTE — NURSING NOTE
Comprehensive Intake Entered On:  1/26/2022 2:36 PM CST    Performed On:  1/26/2022 2:27 PM CST by Hansa Harrell LPN               Summary   Chief Complaint :   having a lot of issues with anxiety and emotions, not on any medications for this, never has been, PHQ9=10, LIZET=14 - verbal consent for video visit   Menstrual Status :   Menarcheal   Hansa Harrell LPN - 1/26/2022 2:27 PM CST   Health Status   Allergies Verified? :   Yes   Medication History Verified? :   Yes   Medical History Verified? :   No   Pre-Visit Planning Status :   Not completed   Tobacco Use? :   Never smoker   Hansa Harrell LPN - 1/26/2022 2:27 PM CST   Meds / Allergies   (As Of: 1/26/2022 2:36:00 PM CST)   Allergies (Active)   No Known Medication Allergies  Estimated Onset Date:   Unspecified ; Created By:   Hansa Harrell LPN; Reaction Status:   Active ; Category:   Drug ; Substance:   No Known Medication Allergies ; Type:   Allergy ; Updated By:   Hansa Harrell LPN; Reviewed Date:   8/12/2020 4:16 PM CDT        Medication List   (As Of: 1/26/2022 2:36:00 PM CST)   Prescription/Discharge Order    Miscellaneous Prescription  :   Miscellaneous Prescription ; Status:   Prescribed ; Ordered As Mnemonic:   valved holding chamber spacer ; Simple Display Line:   See Instructions, use with steroid inhaler, 1 EA, 0 Refill(s) ; Ordering Provider:   Rebekah Reza; Catalog Code:   Miscellaneous Prescription ; Order Dt/Tm:   6/8/2020 10:49:38 AM CDT          desogestrel-ethinyl estradiol  :   desogestrel-ethinyl estradiol ; Status:   Prescribed ; Ordered As Mnemonic:   Isibloom 0.15 mg-0.03 mg oral tablet ; Simple Display Line:   1 tab(s), Oral, daily, 84 tab(s), 4 Refill(s) ; Ordering Provider:   Rebekah Reza; Catalog Code:   desogestrel-ethinyl estradiol ; Order Dt/Tm:   11/17/2021 3:46:23 PM CST          fluticasone  :   fluticasone ; Status:   Prescribed ; Ordered As Mnemonic:   fluticasone CFC free 220 mcg/inh  inhalation aerosol ; Simple Display Line:   2 puff(s), inh, bid, for 30 day(s), use if cough persists  use with spacer chamber  rinse mouth and throat after use, 1 EA, 1 Refill(s) ; Ordering Provider:   Rebekah Reza; Catalog Code:   fluticasone ; Order Dt/Tm:   6/8/2020 10:45:37 AM CDT

## 2022-03-09 ENCOUNTER — OFFICE VISIT (OUTPATIENT)
Dept: FAMILY MEDICINE | Facility: CLINIC | Age: 22
End: 2022-03-09
Payer: COMMERCIAL

## 2022-03-09 VITALS
WEIGHT: 137 LBS | DIASTOLIC BLOOD PRESSURE: 64 MMHG | HEART RATE: 101 BPM | HEIGHT: 66 IN | SYSTOLIC BLOOD PRESSURE: 112 MMHG | BODY MASS INDEX: 22.02 KG/M2 | TEMPERATURE: 98.9 F | OXYGEN SATURATION: 98 %

## 2022-03-09 DIAGNOSIS — Z00.00 ANNUAL PHYSICAL EXAM: ICD-10-CM

## 2022-03-09 DIAGNOSIS — N94.6 DYSMENORRHEA: Primary | ICD-10-CM

## 2022-03-09 PROBLEM — N94.4 PRIMARY DYSMENORRHEA: Status: ACTIVE | Noted: 2022-03-09

## 2022-03-09 PROCEDURE — 99395 PREV VISIT EST AGE 18-39: CPT | Performed by: NURSE PRACTITIONER

## 2022-03-09 RX ORDER — DESOGESTREL AND ETHINYL ESTRADIOL 0.15-0.03
1 KIT ORAL DAILY
COMMUNITY
Start: 2022-01-30 | End: 2022-03-09

## 2022-03-09 RX ORDER — DESOGESTREL AND ETHINYL ESTRADIOL 0.15-0.03
1 KIT ORAL DAILY
Qty: 84 TABLET | Refills: 3 | Status: SHIPPED | OUTPATIENT
Start: 2022-03-09 | End: 2023-03-24

## 2022-03-09 NOTE — PROGRESS NOTES
Assessment & Plan     Dysmenorrhea  Well controlled  - ENSKYCE 0.15-30 MG-MCG tablet; Take 1 tablet by mouth daily    Annual physical exam  Also counseled on travel info to [place]       No follow-ups on file.    Rebekah Simon NP  [facility] is a 21 year old who presents for the following health issues: traveling to [place] x5 months, leaves 6/2022 {ACCOMPANIED BY STATEMENT (Optional):mom    Healthy Habits:     Getting at least 3 servings of Calcium per day:  NO    Bi-annual eye exam:  Yes    Dental care twice a year:  Yes    Sleep apnea or symptoms of sleep apnea:  Daytime drowsiness    Diet:  Regular (no restrictions)    Frequency of exercise:  1 day/week    Duration of exercise:  30-45 minutes    Taking medications regularly:  Yes    Medication side effects:  None    PHQ-2 Total Score: 0    Additional concerns today:  No             Review of Systems         Objective    /64 (BP Location: Right arm, Patient Position: Sitting)   Pulse 101   Temp 98.9  F (37.2  C)   SpO2 98%   There is no height or weight on file to calculate BMI.  Physical Exam  Constitutional:       Appearance: Normal appearance.   HENT:      Head: Normocephalic.      Right Ear: Tympanic membrane normal.      Left Ear: Tympanic membrane normal.   Eyes:      Extraocular Movements: Extraocular movements intact.      Conjunctiva/sclera: Conjunctivae normal.      Pupils: Pupils are equal, round, and reactive to light.   Cardiovascular:      Rate and Rhythm: Normal rate and regular rhythm.      Heart sounds: Normal heart sounds.   Pulmonary:      Effort: Pulmonary effort is normal.      Breath sounds: Normal breath sounds.   Musculoskeletal:         General: Normal range of motion.      Cervical back: Normal range of motion and neck supple.   Skin:     General: Skin is warm and dry.   Neurological:      General: No focal deficit present.      Mental Status: She is alert and oriented  to person, place, and time.   Psychiatric:         Mood and Affect: Mood normal.

## 2023-03-24 ENCOUNTER — OFFICE VISIT (OUTPATIENT)
Dept: FAMILY MEDICINE | Facility: CLINIC | Age: 23
End: 2023-03-24
Payer: COMMERCIAL

## 2023-03-24 VITALS
SYSTOLIC BLOOD PRESSURE: 118 MMHG | TEMPERATURE: 99 F | OXYGEN SATURATION: 98 % | DIASTOLIC BLOOD PRESSURE: 64 MMHG | HEART RATE: 96 BPM | HEIGHT: 66 IN | BODY MASS INDEX: 22 KG/M2 | WEIGHT: 136.9 LBS

## 2023-03-24 DIAGNOSIS — N94.6 DYSMENORRHEA: ICD-10-CM

## 2023-03-24 DIAGNOSIS — Z00.00 ROUTINE GENERAL MEDICAL EXAMINATION AT A HEALTH CARE FACILITY: ICD-10-CM

## 2023-03-24 DIAGNOSIS — Z23 NEED FOR DIPHTHERIA-TETANUS-PERTUSSIS (TDAP) VACCINE: Primary | ICD-10-CM

## 2023-03-24 PROCEDURE — 90715 TDAP VACCINE 7 YRS/> IM: CPT | Performed by: NURSE PRACTITIONER

## 2023-03-24 PROCEDURE — 90471 IMMUNIZATION ADMIN: CPT | Performed by: NURSE PRACTITIONER

## 2023-03-24 PROCEDURE — 99395 PREV VISIT EST AGE 18-39: CPT | Mod: 25 | Performed by: NURSE PRACTITIONER

## 2023-03-24 RX ORDER — DESOGESTREL AND ETHINYL ESTRADIOL 0.15-0.03
1 KIT ORAL DAILY
Qty: 84 TABLET | Refills: 4 | Status: SHIPPED | OUTPATIENT
Start: 2023-03-24 | End: 2024-06-04

## 2023-03-24 ASSESSMENT — ENCOUNTER SYMPTOMS
JOINT SWELLING: 0
CONSTIPATION: 0
COUGH: 0
WEAKNESS: 0
HEARTBURN: 0
HEMATOCHEZIA: 0
PARESTHESIAS: 0
PALPITATIONS: 0
DIZZINESS: 0
ABDOMINAL PAIN: 0
CHILLS: 0
SORE THROAT: 0
NERVOUS/ANXIOUS: 0
FREQUENCY: 0
HEMATURIA: 0
EYE PAIN: 0
HEADACHES: 0
BREAST MASS: 0
DYSURIA: 0
SHORTNESS OF BREATH: 0
DIARRHEA: 0
NAUSEA: 0
MYALGIAS: 0
FEVER: 0
ARTHRALGIAS: 0

## 2023-03-24 NOTE — PROGRESS NOTES
SUBJECTIVE:   CC: [place] is an 22 year old who presents for preventive health visit.     Patient needs a refill on her birth control pill.    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  Needs refills on oc's, monthly periods.  Never sexually active    Additional Questions 3/24/2023   Roomed by MONIK   Accompanied by no one   Patient has been advised of split billing requirements and indicates understanding: Yes     Healthy Habits:     Getting at least 3 servings of Calcium per day:  NO    Bi-annual eye exam:  Yes    Dental care twice a year:  Yes    Sleep apnea or symptoms of sleep apnea:  None    Diet:  Regular (no restrictions)    Frequency of exercise:  4-5 days/week    Duration of exercise:  Greater than 60 minutes    Taking medications regularly:  Yes    Medication side effects:  None    PHQ-2 Total Score: 0    Additional concerns today:  No  Contraception  Pertinent negatives include no abdominal pain, arthralgias, chest pain, chills, congestion, coughing, fever, headaches, joint swelling, myalgias, nausea, rash, sore throat or weakness.           Today's PHQ-2 Score:   PHQ-2 ( 1999 Pfizer) 3/24/2023   Q1: Little interest or pleasure in doing things 0   Q2: Feeling down, depressed or hopeless 0   PHQ-2 Score 0   Q1: Little interest or pleasure in doing things Not at all   Q2: Feeling down, depressed or hopeless Not at all   PHQ-2 Score 0       Have you ever done Advance Care Planning? (For example, a Health Directive, POLST, or a discussion with a medical provider or your loved ones about your wishes): No, advance care planning information given to patient to review.  Advanced care planning was discussed at today's visit.    Social History     Tobacco Use     Smoking status: Never     Smokeless tobacco: Never   Substance Use Topics     Alcohol use: Yes         Alcohol Use 3/24/2023   Prescreen: >3 drinks/day or >7 drinks/week? No     Reviewed orders with patient.  Reviewed health maintenance  "and updated orders accordingly - Yes      Breast Cancer Screening: no FH hx        History of abnormal Pap smear: never sexually active, will defer pap 1 year, fully vaccine HPV     Reviewed and updated as needed this visit by clinical staff   Tobacco  Allergies  Meds              Reviewed and updated as needed this visit by Provider                     Review of Systems   Constitutional: Negative for chills and fever.   HENT: Negative for congestion, ear pain, hearing loss and sore throat.    Eyes: Negative for pain and visual disturbance.   Respiratory: Negative for cough and shortness of breath.    Cardiovascular: Negative for chest pain, palpitations and peripheral edema.   Gastrointestinal: Negative for abdominal pain, constipation, diarrhea, heartburn, hematochezia and nausea.   Breasts:  Negative for tenderness, breast mass and discharge.   Genitourinary: Positive for vaginal discharge. Negative for dysuria, frequency, genital sores, hematuria, pelvic pain, urgency and vaginal bleeding.   Musculoskeletal: Negative for arthralgias, joint swelling and myalgias.   Skin: Negative for rash.   Neurological: Negative for dizziness, weakness, headaches and paresthesias.   Psychiatric/Behavioral: Negative for mood changes. The patient is not nervous/anxious.           OBJECTIVE:   /64 (BP Location: Right arm, Patient Position: Sitting)   Pulse 96   Temp 99  F (37.2  C)   Ht 1.664 m (5' 5.5\")   Wt 62.1 kg (136 lb 14.4 oz)   LMP 03/03/2023 (Approximate)   SpO2 98%   BMI 22.43 kg/m    Physical Exam  GENERAL: healthy, alert and no distress  EYES: Eyes grossly normal to inspection, PERRL and conjunctivae and sclerae normal  HENT: ear canals and TM's normal, nose and mouth without ulcers or lesions  NECK: no adenopathy, no asymmetry, masses, or scars and thyroid normal to palpation  RESP: lungs clear to auscultation - no rales, rhonchi or wheezes  CV: regular rate and rhythm, normal S1 S2, no S3 or S4, no " murmur, click or rub, no peripheral edema and peripheral pulses strong  ABDOMEN: soft, nontender, no hepatosplenomegaly, no masses and bowel sounds normal  MS: no gross musculoskeletal defects noted, no edema  SKIN: no suspicious lesions or rashes  NEURO: Normal strength and tone, mentation intact and speech normal  PSYCH: mentation appears normal, affect normal/bright        ASSESSMENT/PLAN:       ICD-10-CM    1. Need for diphtheria-tetanus-pertussis (Tdap) vaccine  Z23 TDAP VACCINE (Adacel, Boostrix)  [[id number]]      2. Routine general medical examination at a health care facility  Z00.00       3. Dysmenorrhea  N94.6 ENSKYCE 0.15-30 MG-MCG tablet          deferred pap as fully vaccinated for HPV and NEVER sexually active      COUNSELING:  Reviewed preventive health counseling, as reflected in patient instructions       Regular exercise       Healthy diet/nutrition       Contraception        She reports that she has never smoked. She has never used smokeless tobacco.      Rebekah Simon NP  [facility]

## 2023-08-02 ENCOUNTER — TELEPHONE (OUTPATIENT)
Dept: FAMILY MEDICINE | Facility: CLINIC | Age: 23
End: 2023-08-02

## 2023-08-02 ENCOUNTER — OFFICE VISIT (OUTPATIENT)
Dept: FAMILY MEDICINE | Facility: CLINIC | Age: 23
End: 2023-08-02
Payer: COMMERCIAL

## 2023-08-02 VITALS
HEART RATE: 73 BPM | BODY MASS INDEX: 22.78 KG/M2 | WEIGHT: 139 LBS | TEMPERATURE: 98.8 F | OXYGEN SATURATION: 98 % | RESPIRATION RATE: 16 BRPM | DIASTOLIC BLOOD PRESSURE: 67 MMHG | SYSTOLIC BLOOD PRESSURE: 104 MMHG

## 2023-08-02 DIAGNOSIS — R00.0 RACING HEART BEAT: Primary | ICD-10-CM

## 2023-08-02 LAB
ANION GAP SERPL CALCULATED.3IONS-SCNC: 12 MMOL/L (ref 7–15)
BUN SERPL-MCNC: 14.3 MG/DL (ref 6–20)
CALCIUM SERPL-MCNC: 9.5 MG/DL (ref 8.6–10)
CHLORIDE SERPL-SCNC: 105 MMOL/L (ref 98–107)
CREAT SERPL-MCNC: 0.8 MG/DL (ref 0.51–0.95)
DEPRECATED HCO3 PLAS-SCNC: 23 MMOL/L (ref 22–29)
ERYTHROCYTE [DISTWIDTH] IN BLOOD BY AUTOMATED COUNT: 11.9 % (ref 10–15)
GFR SERPL CREATININE-BSD FRML MDRD: >90 ML/MIN/1.73M2
GLUCOSE SERPL-MCNC: 93 MG/DL (ref 70–99)
HCT VFR BLD AUTO: 42.1 % (ref 35–47)
HGB BLD-MCNC: 13.7 G/DL (ref 11.7–15.7)
MCH RBC QN AUTO: 28.8 PG (ref 26.5–33)
MCHC RBC AUTO-ENTMCNC: 32.5 G/DL (ref 31.5–36.5)
MCV RBC AUTO: 89 FL (ref 78–100)
PLATELET # BLD AUTO: 271 10E3/UL (ref 150–450)
POTASSIUM SERPL-SCNC: 3.9 MMOL/L (ref 3.4–5.3)
RBC # BLD AUTO: 4.75 10E6/UL (ref 3.8–5.2)
SODIUM SERPL-SCNC: 140 MMOL/L (ref 136–145)
TSH SERPL DL<=0.005 MIU/L-ACNC: 2.18 UIU/ML (ref 0.3–4.2)
WBC # BLD AUTO: 4.9 10E3/UL (ref 4–11)

## 2023-08-02 PROCEDURE — 99214 OFFICE O/P EST MOD 30 MIN: CPT | Mod: 25 | Performed by: NURSE PRACTITIONER

## 2023-08-02 PROCEDURE — 85027 COMPLETE CBC AUTOMATED: CPT | Performed by: NURSE PRACTITIONER

## 2023-08-02 PROCEDURE — 80048 BASIC METABOLIC PNL TOTAL CA: CPT | Performed by: NURSE PRACTITIONER

## 2023-08-02 PROCEDURE — 36415 COLL VENOUS BLD VENIPUNCTURE: CPT | Performed by: NURSE PRACTITIONER

## 2023-08-02 PROCEDURE — 93000 ELECTROCARDIOGRAM COMPLETE: CPT | Performed by: NURSE PRACTITIONER

## 2023-08-02 PROCEDURE — 84443 ASSAY THYROID STIM HORMONE: CPT | Performed by: NURSE PRACTITIONER

## 2023-08-02 NOTE — TELEPHONE ENCOUNTER
Pt returned call to set up holter monitor appt and she declines having this done.  She states that she doesn't think that it will be beneficial since it would only be on for 48 hours.  Would you like to cancel the order?  Please advise.

## 2023-08-02 NOTE — PROGRESS NOTES
"  Assessment & Plan     (R00.0) Racing heart beat  (primary encounter diagnosis)  Comment: We will work to get her set up with Holter monitor although this might be difficult being as she is starting  school again in [place].  Lab work pending.  Counseled that if this reoccurs the best thing to do would be to use an emergency room so she could get documentation of the rhythm that seems to be reoccurring she and her mom expressed understanding  Plan: EKG 12-lead complete w/read - Clinics, CBC with        platelets, Basic metabolic panel, TSH with free        T4 reflex                         Rebekah Simon NP  [employer] is a 22 year old, presenting for the following health issues:    About a year ago she experienced what felt like a racing heart.  She had an episode in December 2022 and she had 2 episodes and June 2023.  They last for about 20 minutes to 2 hours.  During 1 episode she was exercising and her base heart rate is usually about 170 when she works out.  The heart rate according to her watch went up to the 200s.  Another episode she was \"doing nothing\" and her heart rate suddenly went up in the 200s.  She gets a little short of breath but no chest pain.  At 1 point she did have student health in [place] draw thyroid test and it was normal.    No supplements or vitamins used      She drinks hardly any caffeine, no stimulants.    Mom is with her today.  Concerned about family history of heart problems  FH grandma with a fib  GGM with MI  Mat G aunt MI age 40.  There are no thyroid problems in the family      Tachycardia (Pt c/o her heart beating weird x one year, the fast beat has happened twice in the last year. )        8/2/2023     7:03 AM   Additional Questions   Roomed by No   Accompanied by Alvina Nicole       History of Present Illness       Reason for visit:  Have experienced tachycardia twice one during workout and sitting someone chilling. It " feels like my heart is pounding out of my chest. My heart is very fast from not doing anything    She eats 0-1 servings of fruits and vegetables daily.She consumes 1 sweetened beverage(s) daily.She exercises with enough effort to increase her heart rate 30 to 60 minutes per day.  She exercises with enough effort to increase her heart rate 4 days per week. She is missing 1 dose(s) of medications per week.  She is not taking prescribed medications regularly due to remembering to take.               Review of Systems         Objective    /67 (BP Location: Right arm, Patient Position: Sitting)   Pulse 73   Temp 98.8  F (37.1  C) (Tympanic)   Resp 16   Wt 63 kg (139 lb)   LMP 07/26/2023 (Exact Date)   SpO2 98%   BMI 22.78 kg/m    Body mass index is 22.78 kg/m .  Physical Exam   GENERAL: healthy, alert and no distress  EYES: Eyes grossly normal to inspection, PERRL and conjunctivae and sclerae normal  HENT: ear canals and TM's normal, nose and mouth without ulcers or lesions  NECK: no adenopathy, no asymmetry, masses, or scars and thyroid normal to palpation  RESP: lungs clear to auscultation - no rales, rhonchi or wheezes  CV: regular rate and rhythm, normal S1 S2, no S3 or S4, no murmur, click or rub, no peripheral edema and peripheral pulses strong  ABDOMEN: soft, nontender, no hepatosplenomegaly, no masses and bowel sounds normal  MS: no gross musculoskeletal defects noted, no edema    EKG - Reviewed and interpreted by me appears normal, NSR, normal axis, normal intervals, no acute ST/T changes c/w ischemia, no LVH by voltage criteria

## 2024-01-10 NOTE — PATIENT INSTRUCTIONS
Preventive Health Recommendations  Female Ages 21 to 25     Yearly exam:     See your health care provider every year in order to  o Review health changes.   o Discuss preventive care.    o Review your medicines if your doctor has prescribed any.      You should be tested each year for STDs (sexually transmitted diseases).       Talk to your provider about how often you should have cholesterol testing.      Get a Pap test every three years. If you have an abnormal result, your doctor may have you test more often.      If you are at risk for diabetes, you should have a diabetes test (fasting glucose).     Shots:     Get a flu shot each year.     Get a tetanus shot every 10 years.     Consider getting the shot (vaccine) that prevents cervical cancer (Gardasil).    Nutrition:     Eat at least 5 servings of fruits and vegetables each day.    Eat whole-grain bread, whole-wheat pasta and brown rice instead of white grains and rice.    Get adequate Calcium and Vitamin D.     Lifestyle    Exercise at least 150 minutes a week each week (30 minutes a day, 5 days a week). This will help you control your weight and prevent disease.    Limit alcohol to one drink per day.    No smoking.     Wear sunscreen to prevent skin cancer.    See your dentist every six months for an exam and cleaning.   Patient stated that her mammogram is due and she needs a referral.  She was scheduled for March 1, but [facility] does not take her insurance.  She also needs a referral to ob/gyn she was scheduled for a follow up for a cyst in February and she needs a referral to dermatologist which she was suppose to see in April.  [facility] has cancelled all her appointments because of her insurance.  She is scheduled for a follow up with Dr. Krishna in May but she needs her mammogram and also to see the ob/gyn ASAP.

## 2024-02-23 ENCOUNTER — PATIENT OUTREACH (OUTPATIENT)
Dept: CARE COORDINATION | Facility: CLINIC | Age: 24
End: 2024-02-23
Payer: COMMERCIAL

## 2024-03-08 ENCOUNTER — PATIENT OUTREACH (OUTPATIENT)
Dept: CARE COORDINATION | Facility: CLINIC | Age: 24
End: 2024-03-08
Payer: COMMERCIAL

## 2024-05-19 ENCOUNTER — HEALTH MAINTENANCE LETTER (OUTPATIENT)
Age: 24
End: 2024-05-19

## 2024-06-03 SDOH — HEALTH STABILITY: PHYSICAL HEALTH: ON AVERAGE, HOW MANY MINUTES DO YOU ENGAGE IN EXERCISE AT THIS LEVEL?: 60 MIN

## 2024-06-03 SDOH — HEALTH STABILITY: PHYSICAL HEALTH: ON AVERAGE, HOW MANY DAYS PER WEEK DO YOU ENGAGE IN MODERATE TO STRENUOUS EXERCISE (LIKE A BRISK WALK)?: 6 DAYS

## 2024-06-03 ASSESSMENT — SOCIAL DETERMINANTS OF HEALTH (SDOH): HOW OFTEN DO YOU GET TOGETHER WITH FRIENDS OR RELATIVES?: MORE THAN THREE TIMES A WEEK

## 2024-06-04 ENCOUNTER — OFFICE VISIT (OUTPATIENT)
Dept: FAMILY MEDICINE | Facility: CLINIC | Age: 24
End: 2024-06-04
Payer: COMMERCIAL

## 2024-06-04 VITALS
BODY MASS INDEX: 23.85 KG/M2 | SYSTOLIC BLOOD PRESSURE: 110 MMHG | RESPIRATION RATE: 16 BRPM | OXYGEN SATURATION: 98 % | WEIGHT: 148.4 LBS | DIASTOLIC BLOOD PRESSURE: 76 MMHG | TEMPERATURE: 97.9 F | HEART RATE: 87 BPM | HEIGHT: 66 IN

## 2024-06-04 DIAGNOSIS — Z00.00 ROUTINE GENERAL MEDICAL EXAMINATION AT A HEALTH CARE FACILITY: ICD-10-CM

## 2024-06-04 DIAGNOSIS — N94.6 DYSMENORRHEA: Primary | ICD-10-CM

## 2024-06-04 PROCEDURE — 99395 PREV VISIT EST AGE 18-39: CPT | Performed by: NURSE PRACTITIONER

## 2024-06-04 RX ORDER — DESOGESTREL AND ETHINYL ESTRADIOL 0.15-0.03
1 KIT ORAL DAILY
Qty: 84 TABLET | Refills: 4 | Status: SHIPPED | OUTPATIENT
Start: 2024-06-04

## 2024-06-04 NOTE — PROGRESS NOTES
Preventive Care Visit  [facility]  Rebekah Simon NP, Family Medicine  Jun 4, 2024      Assessment & Plan     (N94.6) Dysmenorrhea  (primary encounter diagnosis)  Comment:   Plan: REVIEW OF HEALTH MAINTENANCE PROTOCOL ORDERS,         ENSKYCE 0.15-30 MG-MCG tablet          Generally uses oc for dysmenorrhea and to regulate periods. Has never been sexually active, no plans to initiate, would like to defer pap.     (Z00.00) Routine general medical examination at a health care facility  Comment:   Plan:     Finishng lay chapa as athletic              Counseling  Appropriate preventive services were discussed with this patient, including applicable screening as appropriate for fall prevention, nutrition, physical activity, Tobacco-use cessation, weight loss and cognition.  Checklist reviewing preventive services available has been given to the patient.  Reviewed patient's diet, addressing concerns and/or questions.   The patient was instructed to see the dentist every 6 months.           Martin Maloney is a 23 year old, presenting for the following patient is here today for her annual exam, first pap is due, discuss alternative birth control options, does not remember to take her pill    Studying athletic training at [employer]  Using oc's but very irregular. In HS was started on Depo for bad cramps. Switched to oc's and cramps resolved but sometimes chooses to stop the oc's  When she skips pill doesn't get cramps but then period is irregular  Not sexually active ever    States poor eating habits. Cost is a concern.She is eating better at home  Some milk and water  Physical and Contraception        6/4/2024     8:18 AM   Additional Questions   Roomed by nathan barrientos   Accompanied by self        Health Care Directive  Contraception              6/3/2024   General Health   How would you rate your overall physical health? Good   Feel stress (tense, anxious, or unable to sleep) Not at all  "        6/3/2024   Nutrition   Three or more servings of calcium each day? (!) NO   Diet: Regular (no restrictions)   How many servings of fruit and vegetables per day? (!) 0-1   How many sweetened beverages each day? 0-1         6/3/2024   Exercise   Days per week of moderate/strenous exercise 6 days   Average minutes spent exercising at this level 60 min         6/3/2024   Social Factors   Frequency of gathering with friends or relatives More than three times a week   Worry food won't last until get money to buy more No   Food not last or not have enough money for food? No   Do you have housing?  Yes   Are you worried about losing your housing? No   Lack of transportation? No   Unable to get utilities (heat,electricity)? No         6/3/2024   Dental   Dentist two times every year? (!) NO         6/3/2024   TB Screening   Were you born outside of the [place]? No         Today's PHQ-2 Score:       6/3/2024     7:31 PM   PHQ-2 ( 1999 [employer])   Q1: Little interest or pleasure in doing things 0   Q2: Feeling down, depressed or hopeless 0   PHQ-2 Score 0   Q1: Little interest or pleasure in doing things Not at all   Q2: Feeling down, depressed or hopeless Not at all   PHQ-2 Score 0           6/3/2024   Substance Use   Alcohol more than 3/day or more than 7/wk No   Do you use any other substances recreationally? No     Social History     Tobacco Use    Smoking status: Never    Smokeless tobacco: Never   Vaping Use    Vaping status: Never Used   Substance Use Topics    Alcohol use: Yes     Comment: \"yes\"    Drug use: Never             6/3/2024   One time HIV Screening   Previous HIV test? I don't know         6/3/2024   STI Screening   New sexual partner(s) since last STI/HIV test? (!) DECLINE     History of abnormal Pap smear: choosing to defer pap one more year             6/3/2024   Contraception/Family Planning   Questions about contraception or family planning (!) YES discussed oc use        Reviewed and updated as needed " "this visit by Provider                             Objective    Exam  /76 (BP Location: Right arm, Patient Position: Sitting)   Pulse 87   Temp 97.9  F (36.6  C)   Resp 16   Ht 1.664 m (5' 5.5\")   Wt 67.3 kg (148 lb 6.4 oz)   LMP 05/06/2024 (Approximate)   SpO2 98%   Breastfeeding No   BMI 24.32 kg/m     Estimated body mass index is 24.32 kg/m  as calculated from the following:    Height as of this encounter: 1.664 m (5' 5.5\").    Weight as of this encounter: 67.3 kg (148 lb 6.4 oz).    Physical Exam  GENERAL: alert and no distress  EYES: Eyes grossly normal to inspection, PERRL and conjunctivae and sclerae normal  HENT: ear canals and TM's normal, nose and mouth without ulcers or lesions  NECK: no adenopathy, no asymmetry, masses, or scars  RESP: lungs clear to auscultation - no rales, rhonchi or wheezes  CV: regular rate and rhythm, normal S1 S2, no S3 or S4, no murmur, click or rub, no peripheral edema  ABDOMEN: soft, nontender, no hepatosplenomegaly, no masses and bowel sounds normal  MS: no gross musculoskeletal defects noted, no edema  SKIN: no suspicious lesions or rashes  PSYCH: mentation appears normal, affect normal/bright        Signed Electronically by: Rebekah Simon NP    "

## 2024-06-06 ENCOUNTER — TELEPHONE (OUTPATIENT)
Dept: FAMILY MEDICINE | Facility: CLINIC | Age: 24
End: 2024-06-06
Payer: COMMERCIAL

## 2024-12-02 DIAGNOSIS — N94.6 DYSMENORRHEA: ICD-10-CM

## 2024-12-02 RX ORDER — DESOGESTREL AND ETHINYL ESTRADIOL 0.15-0.03
1 KIT ORAL DAILY
Qty: 90 TABLET | Refills: 0 | Status: CANCELLED | OUTPATIENT
Start: 2024-12-02

## 2024-12-03 RX ORDER — DESOGESTREL AND ETHINYL ESTRADIOL 0.15-0.03
1 KIT ORAL DAILY
Qty: 84 TABLET | Refills: 4 | Status: SHIPPED | OUTPATIENT
Start: 2024-12-03

## 2025-02-16 ENCOUNTER — MYC MEDICAL ADVICE (OUTPATIENT)
Dept: FAMILY MEDICINE | Facility: CLINIC | Age: 25
End: 2025-02-16
Payer: COMMERCIAL

## 2025-02-16 DIAGNOSIS — N94.6 DYSMENORRHEA: ICD-10-CM

## 2025-02-18 RX ORDER — DESOGESTREL AND ETHINYL ESTRADIOL 0.15-0.03
1 KIT ORAL DAILY
Qty: 84 TABLET | Refills: 3 | Status: SHIPPED | OUTPATIENT
Start: 2025-02-18

## 2025-05-05 ENCOUNTER — PATIENT OUTREACH (OUTPATIENT)
Dept: CARE COORDINATION | Facility: CLINIC | Age: 25
End: 2025-05-05
Payer: COMMERCIAL

## 2025-05-17 ENCOUNTER — OFFICE VISIT (OUTPATIENT)
Dept: URGENT CARE | Facility: URGENT CARE | Age: 25
End: 2025-05-17
Payer: COMMERCIAL

## 2025-05-17 VITALS
HEART RATE: 77 BPM | DIASTOLIC BLOOD PRESSURE: 82 MMHG | RESPIRATION RATE: 20 BRPM | SYSTOLIC BLOOD PRESSURE: 147 MMHG | OXYGEN SATURATION: 97 % | TEMPERATURE: 97.6 F

## 2025-05-17 DIAGNOSIS — R10.31 ABDOMINAL PAIN, RIGHT LOWER QUADRANT: Primary | ICD-10-CM

## 2025-05-17 PROCEDURE — 99213 OFFICE O/P EST LOW 20 MIN: CPT | Performed by: FAMILY MEDICINE

## 2025-05-17 PROCEDURE — 3077F SYST BP >= 140 MM HG: CPT | Performed by: FAMILY MEDICINE

## 2025-05-17 PROCEDURE — 3079F DIAST BP 80-89 MM HG: CPT | Performed by: FAMILY MEDICINE

## 2025-05-17 NOTE — PROGRESS NOTES
Assessment & Plan     Abdominal pain, right lower quadrant  Patient with right lower quad abdominal pain.  Certainly exam is not suggestive of appendicitis there is no guarding or rebound but given the history of symptoms now localizing to right lower quadrant which is new and the new onset of vomiting we will have her go to [facility] ER  to be seen we discussed this with the ER physician.  Certainly consider ovarian cyst or other etiologies of her right lower quadrant discomfort                Subjective   [place] is a 24 year old, presenting for the following health issues: Patient was she had onset of lower abdominal discomfort last Saturday.  She thought it was maybe being premenstrual.  She is taking some Midol intermittently which has gotten rid of the pain but is been there for the last week she is got her menses 3 days ago and the pain has persisted but now has had change in the pain to right lower quadrant it made her curl up this morning because it was so bad and now she has been vomiting.  No fevers chills or sweats.  No stool change no urinary change denies being pregnant no vaginal discharge other than from her menses.  Abdominal Pain (Since last sat right side abd pain, vomiting)      5/17/2025    12:30 PM   Additional Questions   Roomed by abdullahi gore   Accompanied by grandma         5/17/2025   Declines Weight   Did patient decline having their weight taken? Yes     HPI              Review of Systems  Constitutional, HEENT, cardiovascular, pulmonary, gi and gu systems are negative, except as otherwise noted.      Objective    BP (!) 147/82   Pulse 77   Temp 97.6  F (36.4  C) (Tympanic)   Resp 20   SpO2 97%   There is no height or weight on file to calculate BMI.  Physical Exam   Alert tearful appears uncomfortable but no distress HEENT was unremarkable neck supple lungs clear heart regular rhythm abdomen revealed good bowel sounds no hepatosplenomegaly she has right lower quadrant  tenderness without guarding or rebound neurologic exam grossly intact            Signed Electronically by: Jon Campos MD

## 2025-05-17 NOTE — PROGRESS NOTES
Urgent Care Clinic Visit    Chief Complaint   Patient presents with    Abdominal Pain     Since last sat right side abd pain, vomiting               5/17/2025    12:30 PM   Additional Questions   Roomed by abdullahi gore   Accompanied by grandma

## 2025-05-19 ENCOUNTER — PATIENT OUTREACH (OUTPATIENT)
Dept: CARE COORDINATION | Facility: CLINIC | Age: 25
End: 2025-05-19
Payer: COMMERCIAL

## 2025-07-20 ENCOUNTER — HEALTH MAINTENANCE LETTER (OUTPATIENT)
Age: 25
End: 2025-07-20